# Patient Record
Sex: FEMALE | Race: WHITE | Employment: OTHER | URBAN - METROPOLITAN AREA
[De-identification: names, ages, dates, MRNs, and addresses within clinical notes are randomized per-mention and may not be internally consistent; named-entity substitution may affect disease eponyms.]

---

## 2017-01-17 ENCOUNTER — TELEPHONE (OUTPATIENT)
Dept: PEDIATRICS | Facility: CLINIC | Age: 82
End: 2017-01-17

## 2017-01-17 NOTE — TELEPHONE ENCOUNTER
Received a notice from [facility] stating that they will no longer cover combigan 0.2-0.5% eye drops for pt. Alternative meds that will be covered under pt's plan is: timolol maleate or alphagan p. Please advise.Monique Garcia, CMA

## 2017-01-18 NOTE — TELEPHONE ENCOUNTER
She will need to discuss this with her eye doctor. Please let her son know.       Brandi Agrawal MD

## 2017-01-30 ENCOUNTER — CARE COORDINATION (OUTPATIENT)
Dept: GERIATRIC MEDICINE | Facility: CLINIC | Age: 82
End: 2017-01-30

## 2017-01-30 NOTE — PROGRESS NOTES
TC to client's son Deepa to ask if client has switched to her AL's onsite team. He stated that he has the paperwork, but hasn't switched her yet. Discussed that CM is due to see client in February. Deepa will check his calendar to see when he is able to schedule this.  Gracie Rodriguez RN Case Manager  [facility]  [phone]

## 2017-02-27 ENCOUNTER — CARE COORDINATION (OUTPATIENT)
Dept: GERIATRIC MEDICINE | Facility: CLINIC | Age: 82
End: 2017-02-27

## 2017-02-27 NOTE — PROGRESS NOTES
Home visit/Kashif Risk Assessment/EW screening completed on: February 27, 2017 with client and her son Deepa  Member resides: Assisted living in the Memory unit  Member currently receiving the following services: Al Services, Incontinence supplies  See EMR for a list of client's diagnoses and medications.   Medication management: Medications reviewed:Yes. Medication management: Care giver administers medication. Medication understanding:Caregiver has understanding of regimen and is able to complete med set up/administration Yes. MTM offered.  Member Mood/behavior:  Client denies depression   Plan of Care: Continue with current services.  Follow-Up Plan: Member informed of future contact, plan to f/u with member with a 6 month telephone assessment.  Contact information shared with member and family, encouraged member to call with any questions or concerns prior to this.  See [facility] for further detailed information    Gracie Rodriguez RN Case Manager  [facility]  [phone]

## 2017-03-15 ENCOUNTER — CARE COORDINATION (OUTPATIENT)
Dept: GERIATRIC MEDICINE | Facility: CLINIC | Age: 82
End: 2017-03-15

## 2017-03-15 NOTE — PROGRESS NOTES
Mailed copy of care plan to client.  As requested/needed:  emailed CPS to Dede for auths, updated services in access as needed and submitted appropriate referrals/auths, and entered MMIS. Chart was returned to CM.   Mailed copy of CL tool to client, faxed copy to AL facility, uploaded into MNits and emailed copy to Dede Z for auth.  Ludivina Carrera  CMS Supervisor  [facility]  [phone]

## 2017-03-17 ENCOUNTER — CARE COORDINATION (OUTPATIENT)
Dept: GERIATRIC MEDICINE | Facility: CLINIC | Age: 82
End: 2017-03-17

## 2017-03-17 NOTE — PROGRESS NOTES
Received notice from [facility] that client's MA will stop at the end of March because they didn't help them find out if they have other health insurance. TM left for client's son Deepa informing him of this. Deepa called back to state that he send that information to the [facility].  Gracie Rodriguez RN Case Manager  [facility]  [phone]

## 2017-04-04 ENCOUNTER — TELEPHONE (OUTPATIENT)
Dept: PEDIATRICS | Facility: CLINIC | Age: 82
End: 2017-04-04

## 2017-04-04 NOTE — TELEPHONE ENCOUNTER
[facility] from [facility] calling, Nereida, wondering if you are considered PCP since patient is under memory care and has cognitive impairment. Also would like to know if you are signing off on orders so they know who to get future orders from.    Would like a call back at: [phone] or may call Intake: [phone]    Gerri Matthews RN, BSN, PHN

## 2017-04-04 NOTE — TELEPHONE ENCOUNTER
I spoke with Nereida and Deepa requested that in-house provider see patient.  They don't have a POA for health care on file.  Their provider would be able to assume care but is requesting office appointment notes from 2016.  Patient is in memory care, and unable to sign NATALIYA.  [phone].  Attn Nereida.  Prabha, are we able to fax this information per continuity of care of does this need to go thru [facility]?  MICHELLE Bardales RN

## 2017-04-04 NOTE — TELEPHONE ENCOUNTER
The last time I saw her her son was planning to switch her care to the in house doctor.  They would need to contact him to see if he's done that.  If the family wants to keep seeing me then they would need to bring her in.    Brandi Agrawal MD

## 2017-04-26 ENCOUNTER — RECORDS - HEALTHEAST (OUTPATIENT)
Dept: LAB | Facility: CLINIC | Age: 82
End: 2017-04-26

## 2017-04-27 LAB — HBA1C MFR BLD: 6.4 % (ref 4.2–6.1)

## 2017-07-18 ENCOUNTER — TELEPHONE (OUTPATIENT)
Dept: PEDIATRICS | Facility: CLINIC | Age: 82
End: 2017-07-18

## 2017-07-18 DIAGNOSIS — F41.9 ANXIETY: ICD-10-CM

## 2017-07-18 RX ORDER — LORAZEPAM 0.5 MG/1
0.5 TABLET ORAL EVERY 6 HOURS PRN
Qty: 20 TABLET | Refills: 0 | Status: CANCELLED | OUTPATIENT
Start: 2017-07-18

## 2017-07-18 NOTE — TELEPHONE ENCOUNTER
LORazepam (ATIVAN) 0.5 MG tablet      Last Written Prescription Date:  7/6/2016  Last Fill Quantity: 20,   # refills: 0  Last Office Visit with [facility] or [facility] prescribing provider: 7/19/2016  Future Office visit:       Routing refill request to provider for review/approval because:  Drug not on the [facility] or [facility] refill protocol or controlled substance

## 2017-07-19 NOTE — TELEPHONE ENCOUNTER
Per note 4/4/2017, patient was going to switch to house doc. Please call and find out if this has been done. All refills should be going to this person. If has not switched, then needs to come in for visit. Has not seen Dr. Agrawal in over a year.    Thanks,  Nataly Calderón MD  Internal Medicine/Pediatrics

## 2017-07-21 NOTE — TELEPHONE ENCOUNTER
Patient;s son calls and confirms that all refill were transferred to the house doc-please disregard this message.    Felicia Myers, RN  Message handled by Nurse Triage.

## 2017-08-07 PROBLEM — Z76.89 HEALTH CARE HOME: Status: ACTIVE | Noted: 2017-08-07

## 2017-09-13 ENCOUNTER — APPOINTMENT (OUTPATIENT)
Dept: CT IMAGING | Facility: CLINIC | Age: 82
End: 2017-09-13
Attending: EMERGENCY MEDICINE
Payer: COMMERCIAL

## 2017-09-13 ENCOUNTER — HOSPITAL ENCOUNTER (OUTPATIENT)
Facility: CLINIC | Age: 82
Setting detail: OBSERVATION
Discharge: NURSING, REHAB OR LONG-TERM CARE | End: 2017-09-14
Attending: EMERGENCY MEDICINE | Admitting: INTERNAL MEDICINE
Payer: COMMERCIAL

## 2017-09-13 ENCOUNTER — APPOINTMENT (OUTPATIENT)
Dept: GENERAL RADIOLOGY | Facility: CLINIC | Age: 82
End: 2017-09-13
Attending: EMERGENCY MEDICINE
Payer: COMMERCIAL

## 2017-09-13 DIAGNOSIS — K57.32 DIVERTICULITIS OF COLON: ICD-10-CM

## 2017-09-13 DIAGNOSIS — R10.84 ABDOMINAL PAIN, GENERALIZED: ICD-10-CM

## 2017-09-13 DIAGNOSIS — R79.89 ELEVATED TROPONIN: ICD-10-CM

## 2017-09-13 PROBLEM — K57.92 DIVERTICULITIS: Status: ACTIVE | Noted: 2017-09-13

## 2017-09-13 LAB
ALBUMIN SERPL-MCNC: 3.4 G/DL (ref 3.4–5)
ALBUMIN UR-MCNC: NEGATIVE MG/DL
ALP SERPL-CCNC: 63 U/L (ref 40–150)
ALT SERPL W P-5'-P-CCNC: 13 U/L (ref 0–50)
ANION GAP SERPL CALCULATED.3IONS-SCNC: 9 MMOL/L (ref 3–14)
APPEARANCE UR: CLEAR
AST SERPL W P-5'-P-CCNC: 17 U/L (ref 0–45)
BASOPHILS # BLD AUTO: 0 10E9/L (ref 0–0.2)
BASOPHILS NFR BLD AUTO: 0.4 %
BILIRUB SERPL-MCNC: 0.5 MG/DL (ref 0.2–1.3)
BILIRUB UR QL STRIP: NEGATIVE
BUN SERPL-MCNC: 26 MG/DL (ref 7–30)
CALCIUM SERPL-MCNC: 8.9 MG/DL (ref 8.5–10.1)
CHLORIDE SERPL-SCNC: 102 MMOL/L (ref 94–109)
CO2 SERPL-SCNC: 27 MMOL/L (ref 20–32)
COLOR UR AUTO: ABNORMAL
CREAT SERPL-MCNC: 1.1 MG/DL (ref 0.52–1.04)
DIFFERENTIAL METHOD BLD: ABNORMAL
EOSINOPHIL # BLD AUTO: 0 10E9/L (ref 0–0.7)
EOSINOPHIL NFR BLD AUTO: 0.2 %
ERYTHROCYTE [DISTWIDTH] IN BLOOD BY AUTOMATED COUNT: 13.2 % (ref 10–15)
GFR SERPL CREATININE-BSD FRML MDRD: 47 ML/MIN/1.7M2
GLUCOSE BLDC GLUCOMTR-MCNC: 184 MG/DL (ref 70–99)
GLUCOSE SERPL-MCNC: 148 MG/DL (ref 70–99)
GLUCOSE UR STRIP-MCNC: NEGATIVE MG/DL
HCT VFR BLD AUTO: 35 % (ref 35–47)
HGB BLD-MCNC: 11.8 G/DL (ref 11.7–15.7)
HGB UR QL STRIP: NEGATIVE
IMM GRANULOCYTES # BLD: 0 10E9/L (ref 0–0.4)
IMM GRANULOCYTES NFR BLD: 0.3 %
INTERPRETATION ECG - MUSE: NORMAL
KETONES UR STRIP-MCNC: NEGATIVE MG/DL
LACTATE BLD-SCNC: 0.9 MMOL/L (ref 0.7–2)
LEUKOCYTE ESTERASE UR QL STRIP: ABNORMAL
LIPASE SERPL-CCNC: 232 U/L (ref 73–393)
LYMPHOCYTES # BLD AUTO: 1 10E9/L (ref 0.8–5.3)
LYMPHOCYTES NFR BLD AUTO: 10 %
MCH RBC QN AUTO: 31.6 PG (ref 26.5–33)
MCHC RBC AUTO-ENTMCNC: 33.7 G/DL (ref 31.5–36.5)
MCV RBC AUTO: 94 FL (ref 78–100)
MONOCYTES # BLD AUTO: 1.1 10E9/L (ref 0–1.3)
MONOCYTES NFR BLD AUTO: 10.7 %
MUCOUS THREADS #/AREA URNS LPF: PRESENT /LPF
NEUTROPHILS # BLD AUTO: 8.2 10E9/L (ref 1.6–8.3)
NEUTROPHILS NFR BLD AUTO: 78.4 %
NITRATE UR QL: NEGATIVE
NRBC # BLD AUTO: 0 10*3/UL
NRBC BLD AUTO-RTO: 0 /100
PH UR STRIP: 9 PH (ref 5–7)
PLATELET # BLD AUTO: 139 10E9/L (ref 150–450)
POTASSIUM SERPL-SCNC: 3.9 MMOL/L (ref 3.4–5.3)
PROT SERPL-MCNC: 7 G/DL (ref 6.8–8.8)
RBC # BLD AUTO: 3.73 10E12/L (ref 3.8–5.2)
RBC #/AREA URNS AUTO: 3 /HPF (ref 0–2)
SODIUM SERPL-SCNC: 138 MMOL/L (ref 133–144)
SOURCE: ABNORMAL
SP GR UR STRIP: 1.01 (ref 1–1.03)
SQUAMOUS #/AREA URNS AUTO: <1 /HPF (ref 0–1)
TROPONIN I SERPL-MCNC: 0.11 UG/L (ref 0–0.04)
TROPONIN I SERPL-MCNC: 0.11 UG/L (ref 0–0.04)
TROPONIN I SERPL-MCNC: 0.12 UG/L (ref 0–0.04)
UROBILINOGEN UR STRIP-MCNC: 0 MG/DL (ref 0–2)
WBC # BLD AUTO: 10.4 10E9/L (ref 4–11)
WBC #/AREA URNS AUTO: 7 /HPF (ref 0–2)

## 2017-09-13 PROCEDURE — 25000128 H RX IP 250 OP 636: Performed by: EMERGENCY MEDICINE

## 2017-09-13 PROCEDURE — 74177 CT ABD & PELVIS W/CONTRAST: CPT

## 2017-09-13 PROCEDURE — 36415 COLL VENOUS BLD VENIPUNCTURE: CPT | Performed by: INTERNAL MEDICINE

## 2017-09-13 PROCEDURE — 72125 CT NECK SPINE W/O DYE: CPT

## 2017-09-13 PROCEDURE — 96375 TX/PRO/DX INJ NEW DRUG ADDON: CPT

## 2017-09-13 PROCEDURE — 96367 TX/PROPH/DG ADDL SEQ IV INF: CPT

## 2017-09-13 PROCEDURE — 96361 HYDRATE IV INFUSION ADD-ON: CPT

## 2017-09-13 PROCEDURE — 80053 COMPREHEN METABOLIC PANEL: CPT | Performed by: EMERGENCY MEDICINE

## 2017-09-13 PROCEDURE — 70450 CT HEAD/BRAIN W/O DYE: CPT

## 2017-09-13 PROCEDURE — G0378 HOSPITAL OBSERVATION PER HR: HCPCS

## 2017-09-13 PROCEDURE — 71020 XR CHEST 2 VW: CPT

## 2017-09-13 PROCEDURE — 99285 EMERGENCY DEPT VISIT HI MDM: CPT | Mod: 25

## 2017-09-13 PROCEDURE — 25000128 H RX IP 250 OP 636: Performed by: INTERNAL MEDICINE

## 2017-09-13 PROCEDURE — 25000132 ZZH RX MED GY IP 250 OP 250 PS 637: Performed by: INTERNAL MEDICINE

## 2017-09-13 PROCEDURE — 93005 ELECTROCARDIOGRAM TRACING: CPT

## 2017-09-13 PROCEDURE — 72100 X-RAY EXAM L-S SPINE 2/3 VWS: CPT

## 2017-09-13 PROCEDURE — 40000916 ZZH STATISTIC SITTER, NIGHT HOURS

## 2017-09-13 PROCEDURE — 84484 ASSAY OF TROPONIN QUANT: CPT | Performed by: INTERNAL MEDICINE

## 2017-09-13 PROCEDURE — 25000132 ZZH RX MED GY IP 250 OP 250 PS 637: Performed by: EMERGENCY MEDICINE

## 2017-09-13 PROCEDURE — 84484 ASSAY OF TROPONIN QUANT: CPT | Mod: 91 | Performed by: EMERGENCY MEDICINE

## 2017-09-13 PROCEDURE — 85025 COMPLETE CBC W/AUTO DIFF WBC: CPT | Performed by: EMERGENCY MEDICINE

## 2017-09-13 PROCEDURE — 72072 X-RAY EXAM THORAC SPINE 3VWS: CPT

## 2017-09-13 PROCEDURE — 36415 COLL VENOUS BLD VENIPUNCTURE: CPT | Performed by: EMERGENCY MEDICINE

## 2017-09-13 PROCEDURE — 83690 ASSAY OF LIPASE: CPT | Performed by: EMERGENCY MEDICINE

## 2017-09-13 PROCEDURE — 40000914 ZZH STATISTIC SITTER, DAY HOURS

## 2017-09-13 PROCEDURE — 87086 URINE CULTURE/COLONY COUNT: CPT | Performed by: INTERNAL MEDICINE

## 2017-09-13 PROCEDURE — 96365 THER/PROPH/DIAG IV INF INIT: CPT | Mod: 59

## 2017-09-13 PROCEDURE — 99219 ZZC INITIAL OBSERVATION CARE,LEVL II: CPT | Performed by: INTERNAL MEDICINE

## 2017-09-13 PROCEDURE — 25000125 ZZHC RX 250: Performed by: INTERNAL MEDICINE

## 2017-09-13 PROCEDURE — 25000125 ZZHC RX 250: Performed by: EMERGENCY MEDICINE

## 2017-09-13 PROCEDURE — 83605 ASSAY OF LACTIC ACID: CPT | Performed by: EMERGENCY MEDICINE

## 2017-09-13 PROCEDURE — 96366 THER/PROPH/DIAG IV INF ADDON: CPT | Mod: 59

## 2017-09-13 PROCEDURE — 00000146 ZZHCL STATISTIC GLUCOSE BY METER IP

## 2017-09-13 PROCEDURE — 81001 URINALYSIS AUTO W/SCOPE: CPT | Performed by: EMERGENCY MEDICINE

## 2017-09-13 RX ORDER — ASPIRIN 325 MG
325 TABLET ORAL ONCE
Status: COMPLETED | OUTPATIENT
Start: 2017-09-13 | End: 2017-09-13

## 2017-09-13 RX ORDER — CIPROFLOXACIN 2 MG/ML
400 INJECTION, SOLUTION INTRAVENOUS EVERY 12 HOURS
Status: DISCONTINUED | OUTPATIENT
Start: 2017-09-13 | End: 2017-09-13

## 2017-09-13 RX ORDER — SODIUM CHLORIDE 9 MG/ML
INJECTION, SOLUTION INTRAVENOUS CONTINUOUS
Status: DISCONTINUED | OUTPATIENT
Start: 2017-09-13 | End: 2017-09-14

## 2017-09-13 RX ORDER — NALOXONE HYDROCHLORIDE 0.4 MG/ML
.1-.4 INJECTION, SOLUTION INTRAMUSCULAR; INTRAVENOUS; SUBCUTANEOUS
Status: DISCONTINUED | OUTPATIENT
Start: 2017-09-13 | End: 2017-09-14 | Stop reason: HOSPADM

## 2017-09-13 RX ORDER — ONDANSETRON 2 MG/ML
4 INJECTION INTRAMUSCULAR; INTRAVENOUS EVERY 6 HOURS PRN
Status: DISCONTINUED | OUTPATIENT
Start: 2017-09-13 | End: 2017-09-14 | Stop reason: HOSPADM

## 2017-09-13 RX ORDER — POLYETHYLENE GLYCOL 3350 17 G/17G
17 POWDER, FOR SOLUTION ORAL DAILY PRN
Status: DISCONTINUED | OUTPATIENT
Start: 2017-09-13 | End: 2017-09-14 | Stop reason: HOSPADM

## 2017-09-13 RX ORDER — CIPROFLOXACIN 2 MG/ML
400 INJECTION, SOLUTION INTRAVENOUS EVERY 12 HOURS
Status: DISCONTINUED | OUTPATIENT
Start: 2017-09-14 | End: 2017-09-14 | Stop reason: HOSPADM

## 2017-09-13 RX ORDER — QUETIAPINE FUMARATE 25 MG/1
12.5 TABLET, FILM COATED ORAL DAILY PRN
COMMUNITY

## 2017-09-13 RX ORDER — ONDANSETRON 4 MG/1
4 TABLET, ORALLY DISINTEGRATING ORAL EVERY 6 HOURS PRN
Status: DISCONTINUED | OUTPATIENT
Start: 2017-09-13 | End: 2017-09-14 | Stop reason: HOSPADM

## 2017-09-13 RX ORDER — QUETIAPINE FUMARATE 25 MG/1
12.5 TABLET, FILM COATED ORAL DAILY
COMMUNITY

## 2017-09-13 RX ORDER — QUETIAPINE FUMARATE 25 MG/1
25 TABLET, FILM COATED ORAL EVERY 8 HOURS PRN
Status: DISCONTINUED | OUTPATIENT
Start: 2017-09-13 | End: 2017-09-14 | Stop reason: HOSPADM

## 2017-09-13 RX ORDER — ONDANSETRON 2 MG/ML
4 INJECTION INTRAMUSCULAR; INTRAVENOUS ONCE
Status: COMPLETED | OUTPATIENT
Start: 2017-09-13 | End: 2017-09-13

## 2017-09-13 RX ORDER — IOPAMIDOL 755 MG/ML
500 INJECTION, SOLUTION INTRAVASCULAR ONCE
Status: COMPLETED | OUTPATIENT
Start: 2017-09-13 | End: 2017-09-13

## 2017-09-13 RX ADMIN — IOPAMIDOL 85 ML: 755 INJECTION, SOLUTION INTRAVENOUS at 11:06

## 2017-09-13 RX ADMIN — METRONIDAZOLE 500 MG: 500 INJECTION, SOLUTION INTRAVENOUS at 22:01

## 2017-09-13 RX ADMIN — ONDANSETRON 4 MG: 2 INJECTION INTRAMUSCULAR; INTRAVENOUS at 13:22

## 2017-09-13 RX ADMIN — CIPROFLOXACIN 400 MG: 2 INJECTION, SOLUTION INTRAVENOUS at 15:42

## 2017-09-13 RX ADMIN — SODIUM CHLORIDE 61 ML: 9 INJECTION, SOLUTION INTRAVENOUS at 11:06

## 2017-09-13 RX ADMIN — QUETIAPINE FUMARATE 25 MG: 25 TABLET, FILM COATED ORAL at 21:24

## 2017-09-13 RX ADMIN — METRONIDAZOLE 500 MG: 500 INJECTION, SOLUTION INTRAVENOUS at 14:32

## 2017-09-13 RX ADMIN — ASPIRIN 325 MG ORAL TABLET 325 MG: 325 PILL ORAL at 11:56

## 2017-09-13 RX ADMIN — SODIUM CHLORIDE: 9 INJECTION, SOLUTION INTRAVENOUS at 17:41

## 2017-09-13 NOTE — ED NOTES
Pt arrives via EMS from [facility] d/t fall. Per EMS, staff were doing rounds when they found pt undressed in her laundry basket in closet. Pt ambulatory on scene with EMS but staff states she is a little slower in her gait than normal. No change in neuro status, at pt's baseline with dementia. Pt denies pain.

## 2017-09-13 NOTE — IP AVS SNAPSHOT
Lula Sweet #[id number] (CSN: [id number])  (85 year old F)  (Adm: 17)     [id number]               [facility] OBSERVATION DEPARTMENT: [phone]            Patient Demographics     Patient Name Sex          Age SSN Address Phone    Lula Sweet Female 1932 (85 year old) [id number]  [facility] [address] [phone] (Home)  none (Work)  [phone] (Mobile) *Preferred*      Emergency Contact(s)     Name Relation Home Work Mobile    ANNETTE SWEET Son [phone]    Lori Sweet Daughter [phone] none none      Admission Information     Attending Provider Admitting Provider Admission Type Admission Date/Time    Jerald Thomas MD Jerald allan MD Emergency 17  0825    Discharge Date Hospital Service Auth/Cert Status Service Area     Observation Incomplete [facility]    Unit Room/Bed Admission Status        OBSERVATION DEPT  Admission (Confirmed)       Admission     Complaint    Diverticulitis      Hospital Account     Name Acct ID Class Status Primary Coverage    Lula Sweet [id number] Observation Open UCARE - UCARE FOR SENIORS [facility]/NON [employer] PARTNERS            Guarantor Account (for Hospital Account #[id number])     Name Relation to Pt Service Area Active? Acct Type    Lula Sweet Self FCS Yes Personal/Family    Address Phone          [address]  [place], [place] [address] [phone](H)  none(O)              Coverage Information (for Hospital Account #[id number])     F/O Payor/Plan Precert #    UCARE/UCARE FOR SENIORS [facility]/NON [employer] PARTNERS     Subscriber Subscriber #    Lula Sweet [id number]    Address Phone    [address]  [place] [id number] [phone]                                                INTERAGENCY TRANSFER FORM - PHYSICIAN ORDERS   2017                       [facility] OBSERVATION  "DEPARTMENT: [phone]            Attending Provider: Jerald Thomas MD     Allergies:  Penicillins    Infection:  None   Service:  Observation    Ht:  1.702 m (5' 7.01\")   Wt:  --   Admission Wt:  --    BMI:  --   BSA:  --            ED Clinical Impression     Diagnosis Description Comment Added By Time Added    Diverticulitis of colon [K57.32] Diverticulitis of colon [K57.32]  Nirali Lawrence MD 9/13/2017  3:07 PM    Abdominal pain, generalized [R10.84] Abdominal pain, generalized [R10.84]  Nirali Lawrence MD 9/13/2017  3:08 PM    Elevated troponin [R74.8] Elevated troponin [R74.8]  Nirali Lawrence MD 9/13/2017  3:08 PM      Hospital Problems as of 9/14/2017              Priority Class Noted POA    Diverticulitis Medium  9/13/2017 Yes      Non-Hospital Problems as of 9/14/2017              Priority Class Noted    Hyperlipidemia with target LDL less than 100 High  1/26/2010    Osteoporosis Medium  4/28/2010    Cystocele Low  4/28/2010    Advanced directives, counseling/discussion Low  6/28/2011    Glaucoma High  1/3/2012    Type 2 diabetes mellitus with stage 3 chronic kidney disease (H) High  9/5/2012    Mild dementia Low  2/1/2013    Vitamin D deficiency Medium  10/7/2014    Chondrocalcinosis of knee Medium  7/8/2015    CKD (chronic kidney disease) stage 3, GFR 30-59 ml/min Medium  11/15/2015    Chest pain Medium  12/13/2015    Osteoarthritis of multiple joints Medium  7/1/2016    Health Care Home Medium  8/7/2017      Code Status History     Date Active Date Inactive Code Status Order ID Comments User Context    9/14/2017 10:55 AM  DNR/DNI [id number]  Margarita Valerio DO Outpatient    9/13/2017  4:39 PM 9/14/2017 10:55 AM DNR/DNI [id number]  Jerald Thomas MD Inpatient    12/15/2015  9:10 AM 9/13/2017  4:39 PM Full Code [id number]  Gisele Kapoor MD Outpatient    12/13/2015  3:34 AM 12/15/2015  9:10 AM Full Code [id number]  Judson Brian MD Inpatient    7/1/2014 12:24 " PM 12/13/2015  3:34 AM DNR/DNI [id number]  Lina Bardales RN Outpatient    5/1/2014 11:57 AM 7/1/2014 12:24 PM Full Code [id number]  Lina Bardales RN Outpatient      Current Code Status     Date Active Code Status Order ID Comments User Context       Prior      Summary of Visit     Reason for your hospital stay       You were admitted from memory care after being found in a laundry basket in the closet.  You were not found to have any fractures and no clear infectious process prior to d/c.  You need to be re-evaluated if you have any fevers, abd pain or loss of appetite.                Medication Review      CONTINUE these medications which have NOT CHANGED        Dose / Directions Comments    ACE/ARB NOT PRESCRIBED (INTENTIONAL)        ACE & ARB not prescribed due to Intolerance   Refills:  0        ACETAMINOPHEN PO        Dose:  650 mg   Take 650 mg by mouth every 6 hours as needed for pain   Refills:  0        ADVOCATE SAFETY LANCETS Misc   Used for:  Type 2 diabetes mellitus with stage 3 chronic kidney disease (H)        Use to check blood sugars twice daily and as needed   Quantity:  2 Box   Refills:  3        aspirin 81 MG EC tablet   Used for:  Type 2 diabetes, HbA1c goal < 7% (H)        Dose:  81 mg   Take 1 tablet by mouth daily.   Quantity:  90 tablet   Refills:  3        blood glucose monitoring test strip   Commonly known as:  no brand specified   Used for:  Type 2 diabetes, HbA1c goal < 7% (H)        Use to test blood sugars 2 times daily or as directed   Quantity:  200 each   Refills:  3        latanoprost 0.005 % ophthalmic solution   Commonly known as:  XALATAN        Dose:  1 drop   Place 1 drop into both eyes every evening   Refills:  0        LORazepam 0.5 MG tablet   Commonly known as:  ATIVAN   Used for:  Anxiety        Dose:  0.5 mg   Take 1 tablet (0.5 mg) by mouth every 6 hours as needed for anxiety   Quantity:  20 tablet   Refills:  0        metFORMIN 850 MG tablet   Commonly known as:   GLUCOPHAGE        Dose:  850 mg   Take 1 tablet (850 mg) by mouth 2 times daily (with meals)   Quantity:  60 tablet   Refills:  1        pioglitazone 30 MG tablet   Commonly known as:  ACTOS   Used for:  Type 2 diabetes mellitus with stage 3 chronic kidney disease, without long-term current use of insulin (H)        Dose:  30 mg   Take 1 tablet (30 mg) by mouth daily   Quantity:  90 tablet   Refills:  3        * SEROQUEL 25 MG tablet   Generic drug:  QUEtiapine        Dose:  12.5 mg   Take 12.5 mg by mouth daily   Refills:  0        * SEROQUEL 25 MG tablet   Generic drug:  QUEtiapine        Dose:  12.5 mg   Take 12.5 mg by mouth daily as needed   Refills:  0        STATIN NOT PRESCRIBED (INTENTIONAL)        Statin not prescribed intentionally due to Intolerance (with supporting documentation of trying a statin at least once within the last 5 years)   Quantity:  0 each   Refills:  0        vitamin D 2000 UNITS tablet        Dose:  2000 Units   Take 2,000 Units by mouth daily   Quantity:  100 tablet   Refills:  3        * Notice:  This list has 2 medication(s) that are the same as other medications prescribed for you. Read the directions carefully, and ask your doctor or other care provider to review them with you.            After Care     Activity       Your activity upon discharge: as tolerated       Diet       Follow this diet upon discharge: resume home diabetic diet             Follow-Up Appointment Instructions     Follow-up and recommended labs and tests        F/u with PCP as previously scheduled and prn             Statement of Approval     Ordered          09/14/17 1057  I have reviewed and agree with all the recommendations and orders detailed in this document.  EFFECTIVE NOW     Approved and electronically signed by:  Margarita Valerio DO                                                 INTERAGENCY TRANSFER FORM - NURSING   9/13/2017                       [facility] OBSERVATION  "DEPARTMENT: [phone]            Attending Provider: Jerald Thomas MD     Allergies:  Penicillins    Infection:  None   Service:  Observation    Ht:  1.702 m (5' 7.01\")   Wt:  --   Admission Wt:  --    BMI:  --   BSA:  --            Advance Directives        Does patient have a scanned Advance Directive/ACP document in EPIC?           Yes        Immunizations     Name Date      Influenza (High Dose) 3 valent vaccine 10/01/15     Influenza (High Dose) 3 valent vaccine 10/01/14     Influenza (High Dose) 3 valent vaccine 12/13/13     Influenza (High Dose) 3 valent vaccine 09/05/12     Influenza (High Dose) 3 valent vaccine 01/03/12     Influenza (High Dose) 3 valent vaccine 10/26/10     Influenza (IIV3) 10/13/09     Influenza (IIV3) 10/30/08     Influenza (IIV3) 10/24/07     Influenza (IIV3) 10/20/06     Influenza (IIV3) 01/14/05     Influenza (IIV3) 10/24/03     Influenza (IIV3) 10/17/02     Influenza (IIV3) 10/17/01     Influenza (IIV3) 10/24/00     Pneumococcal (PCV 13) 11/13/15     Pneumococcal 23 valent 06/05/12     Pneumococcal 23 valent 12/09/94     TD (ADULT, 7+) 01/03/12     Tetanus 03/14/75       ASSESSMENT     Discharge Profile Flowsheet     EXPECTED DISCHARGE     Patient's communication style  spoken language (English or Bilingual) 09/13/17 1715    Expected Discharge Date  -- (single/[facility]) 09/14/17 0842   SKIN      GASTROINTESTINAL (ADULT,PEDIATRIC,OB)     Inspection of bony prominences  Full 09/14/17 0821    GI WDL  ex 09/14/17 0821   Full except areas not inspected   -- 09/14/17 0451    All Quadrants Bowel Sounds  audible and active in all quadrants 09/14/17 0821   Skin WDL  WDL 09/14/17 0821    Last Bowel Movement  -- (JANINA) 09/14/17 0821   SAFETY      Passing flatus  -- (JANINA) 09/14/17 0821   Safety WDL  WDL 09/14/17 0821    COMMUNICATION ASSESSMENT                        Assessment WDL (Within Defined Limits) Definitions           Safety WDL     Effective: 09/28/15    " "Row Information: <b>WDL Definition:</b> Bed in low position, wheels locked; call light in reach; upper side rails up x 2; ID band on<br> <font color=\"gray\"><i>Item=AS safety wdl>>List=AS safety wdl>>Version=F14</i></font>      Skin WDL     Effective: 09/28/15    Row Information: <b>WDL Definition:</b> Warm; dry; intact; elastic; without discoloration; pressure points without redness<br> <font color=\"gray\"><i>Item=AS skin wdl>>List=AS skin wdl>>Version=F14</i></font>      Vitals     Vital Signs Flowsheet     VITAL SIGNS     HEIGHT AND WEIGHT      Temp  96.8  F (36  C) 09/14/17 1209   Height  1.702 m (5' 7.01\") 09/14/17 0954    Temp src  Oral 09/14/17 1209   Height Method  Estimated 09/13/17 1618    Resp  16 09/14/17 1209   ADRIENNE COMA SCALE      Heart Rate  64 09/14/17 1209   Best Eye Response  4-->(E4) spontaneous 09/14/17 0442    Pulse/Heart Rate Source  Monitor 09/13/17 1618   Best Motor Response  6-->(M6) obeys commands 09/14/17 0442    BP  146/50 09/14/17 1209   Best Verbal Response  4-->(V4) confused 09/14/17 0442    BP Location  Right arm 09/14/17 0811   [place] Coma Scale Score  14 09/14/17 0442    OXYGEN THERAPY     POSITIONING      SpO2  94 % 09/14/17 1209   Chair  Upright in chair 09/14/17 0821    O2 Device  None (Room air) 09/14/17 1211   DAILY CARE      Oxygen Delivery  2 LPM 09/13/17 1943   Activity Type  ambulated to bathroom;up in chair 09/14/17 0821    PAIN/COMFORT     Activity Level of Assistance  assistance, stand-by 09/14/17 0821    Patient Currently in Pain  denies 09/14/17 0811                 Patient Lines/Drains/Airways Status    Active LINES/DRAINS/AIRWAYS     None            Patient Lines/Drains/Airways Status    Active PICC/CVC     None            Intake/Output Detail Report     None      Last Void/BM       Most Recent Value    Urine Occurrence 1 at 09/14/2017 0745    Stool Occurrence       Case Management/Discharge Planning     Case Management/Discharge Planning Flowsheet     REFERRAL " INFORMATION     Expected Discharge Date  -- (single/[facility]) 09/14/17 0842    Arrived From  assisted living facility 12/13/15 0351   ABUSE RISK SCREEN      LIVING ENVIRONMENT     QUESTION TO PATIENT:  Has a member of your family or a partner(now or in the past) intimidated, hurt, manipulated, or controlled you in any way?  no 09/13/17 0831    Lives With  alone (At Assisted Living) 12/13/15 0351   QUESTION TO PATIENT: Do you feel safe going back to the place where you are living?  yes 09/13/17 0831    COPING/STRESS     OBSERVATION: Is there reason to believe there has been maltreatment of a vulnerable adult (ie. Physical/Sexual/Emotional abuse, self neglect, lack of adequate food, shelter, medical care, or financial exploitation)?  no 09/13/17 0831    Major Change/Loss/Stressor  none 12/13/15 0351   HOMICIDE RISK      EXPECTED DISCHARGE     Homicidal Ideation  no 09/13/17 1715                  [facility] OBSERVATION DEPARTMENT: [phone]            Medication Administration Report for Lula Aguilera as of 09/14/17 1235   Legend:    Given Hold Not Given Due Canceled Entry Other Actions    Time Time (Time) Time  Time-Action       Inactive    Active    Linked        Medications 09/08/17 09/09/17 09/10/17 09/11/17 09/12/17 09/13/17 09/14/17    aspirin EC EC tablet 81 mg  Dose: 81 mg Freq: DAILY Route: PO  Start: 09/14/17 0800   Admin Instructions: DO NOT CRUSH.           0813 (81 mg)-Given           ciprofloxacin (CIPRO) infusion 400 mg  Dose: 400 mg Freq: EVERY 12 HOURS Route: IV  Indications of Use: URINARY TRACT INFECTION  Last Dose: Stopped (09/14/17 0651)  Start: 09/14/17 0400   Admin Instructions: Irritant.           0432 (400 mg)-New Bag       0651-Stopped       [ ] 1600           metroNIDAZOLE (FLAGYL) infusion 500 mg  Dose: 500 mg Freq: EVERY 8 HOURS Route: IV  Indications of Use: INTRA-ABDOMINAL INFECTION  Last Dose: Stopped (09/14/17 0651)  Start: 09/13/17 2200          2201 (500 mg)-New Bag        0539 (500 mg)-New Bag       0651-Stopped       [ ] 1400       [ ] 2200           naloxone (NARCAN) injection 0.1-0.4 mg  Dose: 0.1-0.4 mg Freq: EVERY 2 MIN PRN Route: IV  PRN Reason: opioid reversal  Start: 09/13/17 1639   Admin Instructions: For respiratory rate LESS than or EQUAL to 8.  Partial reversal dose:  0.1 mg titrated q 2 minutes for Analgesia Side Effects Monitoring Sedation Level of 3 (frequently drowsy, arousable, drifts to sleep during conversation).Full reversal dose:  0.4 mg bolus for Analgesia Side Effects Monitoring Sedation Level of 4 (somnolent, minimal or no response to stimulation).               ondansetron (ZOFRAN-ODT) ODT tab 4 mg  Dose: 4 mg Freq: EVERY 6 HOURS PRN Route: PO  PRN Reasons: nausea,vomiting  Start: 09/13/17 1639   Admin Instructions: This is Step 1 of nausea and vomiting management.  If nausea not resolved in 15 minutes, go to Step 2 prochlorperazine (COMPAZINE). Do not push through foil backing. Peel back foil and gently remove. Place on tongue immediately. Administration with liquid unnecessary              Or  ondansetron (ZOFRAN) injection 4 mg  Dose: 4 mg Freq: EVERY 6 HOURS PRN Route: IV  PRN Reasons: nausea,vomiting  Start: 09/13/17 1639   Admin Instructions: This is Step 1 of nausea and vomiting management.  If nausea not resolved in 15 minutes, go to Step 2 prochlorperazine (COMPAZINE).  Irritant.               pioglitazone (ACTOS) tablet 30 mg  Dose: 30 mg Freq: DAILY Route: PO  Start: 09/14/17 0800          0812 (30 mg)-Given           polyethylene glycol (MIRALAX/GLYCOLAX) Packet 17 g  Dose: 17 g Freq: DAILY PRN Route: PO  PRN Reason: constipation  Start: 09/13/17 1639   Admin Instructions: Give in 8oz of  water, juice, or soda. Hold for loose stools.  This is the second step of a three step constipation treatment protocol.  1 Packet = 17 grams. Mixed prescribed dose in 8 ounces of water. Follow with 8 oz. of water.                QUEtiapine (SEROquel) half-tab 12.5 mg  Dose: 12.5 mg Freq: DAILY Route: PO  Start: 09/14/17 0800          0812 (12.5 mg)-Given           QUEtiapine (SEROquel) tablet 25 mg  Dose: 25 mg Freq: EVERY 8 HOURS PRN Route: PO  PRN Comment: agitation  Start: 09/13/17 1840         2124 (25 mg)-Given           Future Medications  Medications 09/08/17 09/09/17 09/10/17 09/11/17 09/12/17 09/13/17 09/14/17       latanoprost (XALATAN) 0.005 % ophthalmic solution 1 drop  Dose: 1 drop Freq: EVERY EVENING Route: Both Eyes  Start: 09/14/17 2000   Admin Instructions: Refrigerated product.           [ ] 2000          Completed Medications  Medications 09/08/17 09/09/17 09/10/17 09/11/17 09/12/17 09/13/17 09/14/17         Dose: 1,000 mL Freq: ONCE Route: IV  Last Dose: Stopped (09/13/17 1117)  Start: 09/13/17 1106   End: 09/13/17 1117         1106 (61 mL)-New Bag [C]       1117-Stopped              Dose: 325 mg Freq: ONCE Route: PO  Start: 09/13/17 1135   End: 09/13/17 1156         1156 (325 mg)-Given              Dose: 500 mL Freq: ONCE Route: IV  Start: 09/13/17 1106   End: 09/13/17 1106         1106 (85 mL)-Given              Dose: 4 mg Freq: ONCE Route: IV  Start: 09/13/17 1320   End: 09/13/17 1324   Admin Instructions: Irritant.          1322 (4 mg)-Given           Discontinued Medications  Medications 09/08/17 09/09/17 09/10/17 09/11/17 09/12/17 09/13/17 09/14/17         Rate: 75 mL/hr Freq: CONTINUOUS Route: IV  Last Dose: Stopped (09/14/17 0432)  Start: 09/13/17 1640   End: 09/14/17 1029         1741 ( )-New Bag        0432-Stopped       1029-Med Discontinued         Dose: 400 mg Freq: EVERY 12 HOURS Route: IV  Indications of Use: URINARY TRACT INFECTION  Last Dose: Stopped (09/13/17 1544)  Start: 09/13/17 1415   End: 09/13/17 1639   Admin Instructions: Irritant.          1542 (400 mg)-New Bag       1544-ED Infusing on Admission/transfer       1639-Med Discontinued          Dose: 500 mg Freq: EVERY 8 HOURS Route:  IV  Indications of Use: URINARY TRACT INFECTION  Last Dose: Stopped (09/13/17 1529)  Start: 09/13/17 1415   End: 09/13/17 1639         1432 (500 mg)-New Bag       1529-Stopped       1639-Med Discontinued                 INTERAGENCY TRANSFER FORM - NOTES (H&P, Discharge Summary, Consults, Procedures, Therapies)   9/13/2017                       [facility] OBSERVATION DEPARTMENT: [phone]            History & Physicals     No notes of this type exist for this encounter.         Discharge Summaries      Discharge Summaries by Margarita Valerio DO at 9/14/2017 10:58 AM     Author:  Margarita Valerio DO Service:  Hospitalist Author Type:  Physician    Filed:  9/14/2017 11:10 AM Date of Service:  9/14/2017 10:58 AM Creation Time:  9/14/2017 10:58 AM    Status:  Signed :  Margarita Valerio DO (Physician)         [facility]    Discharge Summary  Hospitalist    Date of Admission:  9/13/2017  Date of Discharge:  9/14/2017  Provider:  Margarita Valerio DO    Discharge Diagnoses   1.  Episode of increased confusion with h/o profound dementia  2.  Mild troponin elevation    Other medical issues:  Past Medical History:   Diagnosis Date     DM type 2 (diabetes mellitus, type 2) (H) 1990's     Glaucoma      Hyperlipidemia LDL goal < 100      Hypertension goal BP (blood pressure) < 130/80        History of Present Illness   Lula Aguilera is an 85 year old female who presented from memory care after being found incontinent of urine in a laundry basket in her room.  Please see the admission history and physical for full details.    Hospital Course   Lula Aguilera was admitted on 9/13/2017.  The following problems were addressed during her hospitalization:    1.  Acute on chronic confusion  Ms. Aguilera presents from memory care with concern for increased confusion.  She was found in a laundry basket in her closet in her room and was incontinent of urine.  She was  brought into the ED where there was no s/s of acute fractures on imaging of the cervical/thoracic/lulmbar spine and no UTI on UA.  Head CT without any acute abnormalities.    She was at her baseline mentation per son in the room at time of d/c.  He had no concerns about her returning to her [facility] care setting.    2.  Abnormalities on CT of the abd/pelvis  There was question of possible diverticulitis vs. neoplasm on CT scan of the abd/pelvis on admission.  She had no fevers or leukocytosis.  No clear c/o abdominal pain and appetite was good (she ate all of her dinner and breakfast).  Discussed at length with family at bedside.  I think that it is reasonable to not give her abx because there is not a clear clinical infection.  They do not want any invasive procedures, as well.    3.  Troponin elevation, mild  Trending down on serial lab checks.  No clear CP and she appears comfortable.  Family not interested in any further testing.    4.  DM  No changes in her home diet (diabetic) or meds at time of discharge.    Significant Results and Procedures     Pending Results   Unresulted Labs Ordered in the Past 30 Days of this Admission     Date and Time Order Name Status Description    9/14/2017 0007 Urine Culture Aerobic Bacterial Preliminary           Code Status   DNR / DNI       Primary Care Physician   Brandi Agrawal    Physical Exam   Temp: 96.8  F (36  C) Temp src: Oral BP: 145/51   Heart Rate: 62 Resp: 16 SpO2: 91 % O2 Device: None (Room air) Oxygen Delivery: 2 LPM  There were no vitals filed for this visit.  Vital Signs with Ranges  Temp:  [96.1  F (35.6  C)-97.8  F (36.6  C)] 96.8  F (36  C)  Heart Rate:  [62-79] 62  Resp:  [16-20] 16  BP: (102-175)/() 145/51  SpO2:  [91 %-96 %] 91 %     PT SEEN AND EXAMINED ON DAY OF D/C  GEN:  Alert, oriented to self only, comfortable at rest  HEENT:  Normocephalic/atraumatic, PERRL, no scleral icterus, no nasal discharge, mouth moist  CV:  Regular rate and  rhythm, no loud murmur to ausc.  S1 + S2 noted, no S3 or S4.  LUNGS:  Clear to auscultation ant/lat bilaterally.  No rales/rhonchi/wheezing auscultated bilaterally.  No costal retractions bilaterally.  Symmetric chest rise on inhalation noted.  ABD:  Active bowel sounds, soft, non-tender, mildly distended.  No clear rebound/guarding/rigidity.  No masses palpated.  No obvious HSM to exam.  EXT:  No edema or cyanosis bilaterally. No joint synovitis noted.  No calf-tenderness or asymmetry noted.  SKIN:  Dry to touch, no rashes or jaundice noted.  PSYCH:  Mood appropriate, Not tearful or depressed.  Not agitated  NEURO:  No tremors at rest, memory poor.  Alert and repetitive    Discharge Disposition   Discharged to memory care    Consultations This Hospital Stay   SOCIAL WORK IP CONSULT    Time Spent on this Encounter   IMargarita, personally saw the patient today and spent greater than 30 minutes discharging this patient.    Discharge Orders     Reason for your hospital stay   You were admitted from memory care after being found in a laundry basket in the closet.  You were not found to have any fractures and no clear infectious process prior to d/c.  You need to be re-evaluated if you have any fevers, abd pain or loss of appetite.     Follow-up and recommended labs and tests    F/u with PCP as previously scheduled and prn     Activity   Your activity upon discharge: as tolerated     DNR/DNI     Diet   Follow this diet upon discharge: resume home diabetic diet       Discharge Medications   Current Discharge Medication List      CONTINUE these medications which have NOT CHANGED    Details   !! QUEtiapine (SEROQUEL) 25 MG tablet Take 12.5 mg by mouth daily      pioglitazone (ACTOS) 30 MG tablet Take 1 tablet (30 mg) by mouth daily  Qty: 90 tablet, Refills: 3    Associated Diagnoses: Type 2 diabetes mellitus with stage 3 chronic kidney disease, without long-term current use of insulin (H)      metFORMIN  (GLUCOPHAGE) 850 MG tablet Take 1 tablet (850 mg) by mouth 2 times daily (with meals)  Qty: 60 tablet, Refills: 1      Cholecalciferol (VITAMIN D) 2000 UNITS tablet Take 2,000 Units by mouth daily  Qty: 100 tablet, Refills: 3      latanoprost (XALATAN) 0.005 % ophthalmic solution Place 1 drop into both eyes every evening       aspirin 81 MG EC tablet Take 1 tablet by mouth daily.  Qty: 90 tablet, Refills: 3    Associated Diagnoses: Type 2 diabetes, HbA1c goal < 7% (H)      !! QUEtiapine (SEROQUEL) 25 MG tablet Take 12.5 mg by mouth daily as needed      LORazepam (ATIVAN) 0.5 MG tablet Take 1 tablet (0.5 mg) by mouth every 6 hours as needed for anxiety  Qty: 20 tablet, Refills: 0    Associated Diagnoses: Anxiety      STATIN NOT PRESCRIBED, INTENTIONAL, Statin not prescribed intentionally due to Intolerance (with supporting documentation of trying a statin at least once within the last 5 years)  Qty: 0 each, Refills: 0      ACE/ARB NOT PRESCRIBED, INTENTIONAL, ACE & ARB not prescribed due to Intolerance      ACETAMINOPHEN PO Take 650 mg by mouth every 6 hours as needed for pain      ADVOCATE SAFETY LANCETS MISC Use to check blood sugars twice daily and as needed  Qty: 2 Box, Refills: 3    Associated Diagnoses: Type 2 diabetes mellitus with stage 3 chronic kidney disease (H)      blood glucose monitoring (NO BRAND SPECIFIED) test strip Use to test blood sugars 2 times daily or as directed  Qty: 200 each, Refills: 3    Associated Diagnoses: Type 2 diabetes, HbA1c goal < 7% (H)       !! - Potential duplicate medications found. Please discuss with provider.        Allergies   Allergies   Allergen Reactions     Penicillins      Data[CB1.1]     Recent Labs  Lab 09/13/17  0955   WBC 10.4   HGB 11.8   HCT 35.0   MCV 94   *       Recent Labs  Lab 09/13/17  0955      POTASSIUM 3.9   CHLORIDE 102   CO2 27   ANIONGAP 9   *   BUN 26   CR 1.10*   GFRESTIMATED 47*   GFRESTBLACK 57*   WADE 8.9       Recent  Labs  Lab 09/13/17  0955      POTASSIUM 3.9   CHLORIDE 102   CO2 27   ANIONGAP 9   *   BUN 26   CR 1.10*   GFRESTIMATED 47*   GFRESTBLACK 57*   WADE 8.9   PROTTOTAL 7.0   ALBUMIN 3.4   BILITOTAL 0.5   ALKPHOS 63   AST 17   ALT 13       Recent Labs  Lab 09/13/17  1835 09/13/17  1318 09/13/17  0955   TROPI 0.110* 0.116* 0.106*       Recent Labs  Lab 09/13/17  0930   COLOR Straw   APPEARANCE Clear   URINEGLC Negative   URINEBILI Negative   URINEKETONE Negative   SG 1.010   UBLD Negative   URINEPH 9.0*   PROTEIN Negative   NITRITE Negative   LEUKEST Trace*   RBCU 3*   WBCU 7*     Results for orders placed or performed during the hospital encounter of 09/13/17   XR Chest 2 Views    Narrative    XR CHEST 2 VW  9/13/2017 11:27 AM    HISTORY:  hypoxia    COMPARISON:  1/24/2016      Impression    IMPRESSION:  Shallow inspiration. Probable scarring at the  costophrenic angles. Heart size likely upper limits of normal for  portable technique. No acute process.     SHAI BAILEY MD   CT Head w/o Contrast    Narrative    CT OF THE HEAD WITHOUT CONTRAST September 13, 2017 11:25 AM     HISTORY: Fall, dementia.    TECHNIQUE: 5 mm thick axial CT images of the head were acquired  without IV contrast material. Radiation dose for this scan was reduced  using automated exposure control, adjustment of the mA and/or kV  according to patient size, or iterative reconstruction technique.    COMPARISON: Brain MR 4/29/2013.    FINDINGS: The study is mildly limited by patient motion. There is  moderate diffuse cerebral volume loss. There are subtle patchy areas  of decreased density in the cerebral white matter bilaterally that are  consistent with sequela of chronic small vessel ischemic disease.     The ventricles and basal cisterns are within normal limits in  configuration given the degree of cerebral volume loss. There is no  midline shift. There are no extra-axial fluid collections.     No intracranial hemorrhage, mass or  recent infarct.    The visualized paranasal sinuses are well-aerated. There is no  mastoiditis. There are no fractures of the visualized bones.       Impression    IMPRESSION: Limited study due to patient motion. Diffuse cerebral  volume loss and cerebral white matter changes consistent with chronic  small vessel ischemic disease. No evidence for acute intracranial  pathology.          LINDA SMITH MD   CT Abdomen Pelvis w Contrast    Narrative    CT ABDOMEN AND PELVIS WITH CONTRAST   9/13/2017 11:25 AM     HISTORY: Dementia, diabetes, patient fell and was found sitting in a  laundry basket and incontinent. Tenderness to palpation of lower  thoracic and upper lumbar spine at the midline.    TECHNIQUE: 85 mL Isovue-370. Radiation dose for this scan was reduced  using automated exposure control, adjustment of the mA and/or kV  according to patient size, or iterative reconstruction technique.    COMPARISON: None.    FINDINGS:   Abdomen: Thoracolumbar degenerative changes with no definite fracture.  Patchy infiltrates at the lung bases. Mild cardiomegaly with no  specific chamber enlargement. Normal liver, spleen, pancreas,  gallbladder and adrenal glands. Small bilateral renal cysts. Patchy  renal cortical thinning. There is a 0.5 cm stone in a posterior left  lower renal calyx. No adenopathy. Stomach and small bowel are  unremarkable.    Pelvis: Colonic diverticulosis. Strand-like densities adjacent to the  descending colon and slight colonic wall thickening and pelvic side  wall thickening suggests diverticulitis, but since the patient is  afebrile, the possibility of neoplasm should be considered. Atrophic  uterus and ovaries. Normal bladder.      Impression    IMPRESSION:  1. No evidence of acute trauma.  2. Thickening of the walls of the sigmoid colon in an area of  diverticulosis with adjacent strand-like density. Differential  diagnosis includes diverticulitis and neoplasm.  3. Patchy nonspecific infiltrates  at the lung bases.  4. Renal cysts.  5. Left lower pole renal calculus.    LONG MURILLO MD   Cervical spine CT w/o contrast    Narrative    CT OF THE CERVICAL SPINE WITHOUT CONTRAST September 13, 2017 11:25 AM     HISTORY: Pain, fall.     TECHNIQUE: Axial images of the cervical spine were acquired without  intravenous contrast. Multiplanar reformations were created. Radiation  dose for this scan was reduced using automated exposure control,  adjustment of the mA and/or kV according to patient size, or iterative  reconstruction technique.    COMPARISON: None.    FINDINGS: The study is mildly limited by patient motion.    There is normal alignment of the cervical vertebrae. Vertebral body  heights of the cervical spine are normal. Craniocervical alignment is  normal. There are no fractures of the cervical spine.        Impression    IMPRESSION: Mildly limited study due to patient motion. No evidence  for fracture or traumatic malalignment.      LINDA SMITH MD   Thoracic spine XR, 3 views    Narrative    XR THORACIC SPINE 3 VW  9/13/2017 2:05 PM    HISTORY:  pain, fall    COMPARISON:  None.      Impression    IMPRESSION:  Significant motion on the lateral view. Multilevel  degenerative changes. Mild vertebral body height loss at several  levels but no focal significant compression fractures.     SHAI BAILEY MD   Lumbar spine XR, 2-3 views    Narrative    XR LUMBAR SPINE 2-3 VIEWS  9/13/2017 2:05 PM    HISTORY:  pain, fall    COMPARISON:  None.      Impression    IMPRESSION:  Osteopenia. No acute process identified. Very mild  multilevel degenerative changes. Degenerative facet changes at L5.  Contrast in the renal collecting system from recent CT scan.    SHAI BAILEY MD[CB1.2]          Revision History        User Key Date/Time User Provider Type Action    > CB1.2 9/14/2017 11:10 AM Margarita Valerio DO Physician Sign     CB1.1 9/14/2017 10:58 AM Margarita Valerio DO Physician                    Consult Notes     No notes of this type exist for this encounter.         Progress Notes - Physician (Notes for yesterday and today)      ED Provider Notes by Nirali Lawrence MD at 9/13/2017  8:25 AM     Author:  Nirali Lawrence MD Service:  Emergency Medicine Author Type:  Physician    Filed:  9/13/2017  3:12 PM Date of Service:  9/13/2017  8:25 AM Creation Time:  9/13/2017  8:34 AM    Status:  Signed :  Nirali Lawrence MD (Physician)           History     Chief Complaint:  Fall    History limited by: Dementia.      Lula Aguilera is a 85 year old female with a history of dementia, diabetes, who presents to the emergency department today for evaluation of a fall. The patient lives in a nursing home where she is checked every two hours. Today, the patient was found sitting in a laundry basket, partially undressed an incontinent of urine. Here, she reports pain[MN1.1] abdominal pain[JB1.1] but is otherwise nonspecific in describing pain.    Allergies:  Penicillins    Medications:    Actos  Ativan  Metformin  Relafen  Latanoprost ophthalmic solution  Combigan opthalmic solution  Fosamax  Aspirin     Past Medical History:    Type 2 diabetes  Glaucoma  Hyperlipidemia  Hypertension  Dementia    Past Surgical History:    Appendectomy     Family History:    Alzheimer's disease - mother    Social History:  The patient was accompanied to the ED by EMS.  Smoking Status: Never smoker  Smokeless Tobacco: Never used  Alcohol Use: No  Marital Status:  Single [1]     Review of Systems   Unable to perform ROS: Dementia     Physical Exam   Vitals:[MN1.1]  Patient Vitals for the past 24 hrs:   BP Temp Temp src Heart Rate Resp SpO2   09/13/17 1404 - - - - - 95 %   09/13/17 1403 - - - - - 94 %   09/13/17 1402 (!) 128/103 - - - - -   09/13/17 1310 - - - - - 94 %   09/13/17 1309 (!) 143/97 - - - - -   09/13/17 1200 102/77 - - 76 - 94 %   09/13/17 1131 - - - 79 - 96 %   09/13/17 1129 142/71 - - - - -   09/13/17 1040  - - - 82 - 96 %   09/13/17 1025 - - - 83 - 95 %   09/13/17 1015 - - - 83 - 96 %   09/13/17 1000 153/59 - - 89 - 94 %   09/13/17 0945 - - - 92 - 95 %   09/13/17 0915 - - - 97 - 94 %   09/13/17 0900 142/65 - - 106 - 94 %   09/13/17 0845 - - - 98 - 95 %   09/13/17 0832 (!) 139/94 98.4  F (36.9  C) Temporal 104 20 90 %   09/13/17 0830 - - - - - 92 %[MN1.2]     Physical Exam  Physical Exam   General: Resting on the bed.  Head: No obvious trauma to head.  Ears, Nose, Throat:  External ears normal.  Nose normal.  No pharygeal erythema, swelling or exudate.  Midline uvula.  Both ears clear, no hemotympanum.  Eyes:  Conjunctivae and EOM are normal.  Right pupil is equal, round, and reactive. Left eye with glaucoma changes, non reactive.   Neck: Normal range of motion.  Neck supple.   CV: Regular rate and rhythm.  No murmurs.      Respiratory: Effort normal and breath sounds normal.  No wheezing or crackles.   Gastrointestinal: Soft.  No distension.[MN1.1] General tenderness to[JB1.1] palpation. There is no rigidity, no rebound and no guarding.   Musculoskeletal: Normal range of motion. Able to range bilateral upper and lower extremities without pain. Tenderness to palpation of low T spine/upper L spine tenderness at midline, overlying  Neuro: Alert, oriented to self. Moving all extremities appropriately.  Normal speech.    Skin: Skin is warm and dry.  No rash noted.     Emergency Department Course     ECG:[MN1.1]  ECG taken at 0913, ECG read at 0921  Normal sinus rhythm  Left anterior fascicular block  Anterolateral infarct, age undetermined  aVL appears largely unchanged  Abnormal ECG  Rate 100 bpm. NJ interval 194. QRS duration 100. QT/QTc 366/472. P-R-T axes 53 -57 72.[MN1.3]    Imaging:  Radiology findings were communicated with the[MN1.1] patient and family[MN1.4] who voiced understanding of the findings.[MN1.1]    XR chest 2 views  Shallow inspiration. Probable scarring at the  costophrenic angles. Heart size likely  upper limits of normal for  portable technique. No acute process.   Reading per radiology    Lumbar spine XR, 2-3 views  Osteopenia. No acute process identified. Very mild  multilevel degenerative changes. Degenerative facet changes at L5.  Contrast in the renal collecting system from recent CT scan.  Reading per radiology    Thoracic spine XR, 3 views  Significant motion on the lateral view. Multilevel  degenerative changes. Mild vertebral body height loss at several  levels but no focal significant compression fractures.   Reading per radiology    Cervical spine CT w/o contrast  Mildly limited study due to patient motion. No evidence  for fracture or traumatic malalignment.  Reading per radiology    CT Abdomen Pelvis w contrast  1. No evidence of acute trauma.  2. Thickening of the walls of the sigmoid colon in an area of  diverticulosis with adjacent strand-like density. Differential  diagnosis includes diverticulitis and neoplasm.  3. Patchy nonspecific infiltrates at the lung bases.  4. Renal cysts.  5. Left lower pole renal calculus.  Reading per radiology    CT Head w/o contrast   Limited study due to patient motion. Diffuse cerebral  volume loss and cerebral white matter changes consistent with chronic  small vessel ischemic disease. No evidence for acute intracranial  pathology.    Reading per radiology[MN1.4]    Laboratory:  Laboratory findings were communicated with the[MN1.1] patient and family[MN1.4] who voiced understanding of the findings.[MN1.1]    CBC:  (L) o/w WNL. (WBC 10.4, HGB 11.8)   Troponin (Collected 0955): 0.106 (H)  Lactic Acid (Collected at 0955): 0.9  CMP: Glucose 148 (H), Creatinine 1.1 (H), GFR 47 (L) o/w WNL  Lipase: 232    UA with micro: pH 9.0 (H), leukocyte esterase urine trace (A), RBC/HPF 3 (H), WBC/HPF 7 (H), Mucous present (A) o/w negative[MN1.4]    Interventions:[MN1.1]  1156 aspirin 325 mg PO  1322 Zofran 4 mg IV[MN1.4]  1432[MN1.5] Flagyl 500 mg IV[MN1.4]  1510[MN1.5]  Cipro 400 mg IV[MN1.4]     Emergency Department Course:  Nursing notes and vitals reviewed.  I performed an exam of the patient as documented above.[MN1.1]   IV was inserted and blood was drawn for laboratory testing, results above.  The patient provided a urine sample here in the emergency department. This was sent for laboratory testing, findings above.  The patient was sent for an XR chest 2 views, Lumbar spine XR, 2-3 views, Thoracic spine XR, 3 views, Cervical spine CT w/o contrast, CT Abdomen Pelvis w contrast, and CT Head w/o contrast while in the emergency department, results above.   At 1223 the patient was rechecked and family was updated on the results of laboratory and imaging studies.   At 1410 the patient was rechecked and was updated on the results of laboratory and imaging studies.   I discussed the treatment plan with the patient. They expressed understanding of this plan and consented to admission. I discussed the patient with[MN1.4] Dr. Thomas[MN1.5], who will admit the patient to a monitored bed for further evaluation and treatment.[MN1.4]  I personally reviewed the laboratory[MN1.1] and imaging[MN1.4] results with the[MN1.1] patient and family[MN1.4] and answered all related questions prior to[MN1.1] admission[MN1.4].    Impression & Plan      Medical Decision Making:[MN1.1]  Lula Aguilera is a 85 year old female with a history of dementia presenting with unclear fall. Patient was found in a basket as noted above. Vitals reassuring, mild tachycardia resolved. Broad differential pursued, including but not limited to, mechanical fall, subdural intracranial hemorrhage, contusion, TBI, acute coronary syndrome, pulmonary embolism, pneumonia, or infection, acute abdominal process, urinary tract infection, etc. Very difficult history obtained as patient has underlying dementia and is unable to give a much of a history. Initial EKG was reviewed showing[MN1.4] no[MN1.5] signs[MN1.4] of[MN1.5] acute  ST[MN1.4]-t wave[JB1.1] changes, aVL appears unchanged. She does have a small troponin bump at 0.106. In[MN1.4] re[JB1.1]viewing prior history, it appears that she has had a troponin elevation in the past as well[MN1.4] but not this high[JB1.1]. Difficult to ascertain if patient is having active chest pain, she denies any. BMP shows CKD, creatinine at baseline. LFTs unremarkable. Lipase unremarkable. Lactate reassuring for any acute intraabdominal process. Urinalysis does appear concerning for mild urinary tract infection. Imaging of her head and cervical spine was obtained.     Imaging returned with negative head and neck images. CT of the abdomen does show thickening of the sigmoid colon, possible diverticulitis versus neoplasm. Nonspecific infiltrates in the lung bases. These are not visible on the chest xray. She is saturating well without any cough to suggest pneumonia at this time. Xray of the T and L spines[MN1.4] negative[JB1.1]. Urinalysis does show evidence of mild urinary tract infection. Plan to treat for diverticulitis and urinary tract infection[MN1.4].[JB1.1]    Had lengthy discussion with son about elevated troponin. Son feels that patient will benefit more from going home than staying in the hospital. Plan to check a repeat troponin to see if down trending. If down trending, this seems appropriate for discharge home. There are no ST changes or ischemic changes on EKG to suggest ACS at this time.  Discussed risks and benefits of this plan including that the patient may die or have a heart attack at the nursing home. He voices understanding of these risks and feels the benefits of returning home exceeds the risk.  Troponin recheck returned elevated at 0.116. I discussed these results with the patient's son and he agreed to admission for management and care.[MN1.4]  Unclear etiology of troponin elevation thus feel admission and serial troponins would be of benefit.   hospitalist kindly accepted  admission.  Stable under my care.[JB1.1]      Diagnosis:[MN1.1]    ICD-10-CM    1. Diverticulitis of colon K57.32    2. Abdominal pain, generalized R10.84    3. Elevated troponin R74.8[JB1.2]        Disposition:[MN1.1]   Admission[MN1.5]    Scribe Disclosure:  I, Marck Brandon, am serving as a scribe at 8:43 AM on 9/13/2017 to document services personally performed by Nirali Lawrence MD, based on my observations and the provider's statements to me.    9/13/2017   [facility] EMERGENCY DEPARTMENT[MN1.1]       Nirali Lawrence MD  09/13/17 1512  [JB1.3]     Revision History        User Key Date/Time User Provider Type Action    > JB1.3 9/13/2017  3:12 PM Nirali Lawrence MD Physician Sign     JB1.2 9/13/2017  3:11 PM Nirali Lawrence MD Physician      JB1.1 9/13/2017  3:08 PM Nirali Lawrence MD Physician      MN1.2 9/13/2017  3:00 PM Aleksandr, Edisaz Scribe Share     MN1.5 9/13/2017  2:54 PM Jonathanawaty, Motaz Scribe      MN1.4 9/13/2017  2:27 PM Jonathanawaty, Motaz Scribe Share     MN1.3 9/13/2017 12:42 PM Jonathanawaty, Motaz Scribe Share     MN1.1 9/13/2017  9:04 AM Jonathanjonaslesly, Motaz Scribe Share                  Procedure Notes     No notes of this type exist for this encounter.      Progress Notes - Therapies (Notes from 09/11/17 through 09/14/17)     No notes of this type exist for this encounter.                                          INTERAGENCY TRANSFER FORM - LAB / IMAGING / EKG / EMG RESULTS   9/13/2017                       [facility] OBSERVATION DEPARTMENT: [phone]            Unresulted Labs     None         Lab Results - 3 Days      Comprehensive metabolic panel [[id number]] (Abnormal)  Resulted: 09/14/17 1143, Result status: Final result    Ordering provider: Margarita Valerio DO  09/14/17 1029 Resulting lab: [facility]    Specimen Information    Type Source Collected On   Blood  09/14/17 1110          Components       Value Reference  Range Flag Lab   Sodium 137 133 - 144 mmol/L  FrRdHs   Potassium 3.7 3.4 - 5.3 mmol/L  FrRdHs   Chloride 102 94 - 109 mmol/L  FrRdHs   Carbon Dioxide 27 20 - 32 mmol/L  FrRdHs   Anion Gap 8 3 - 14 mmol/L  FrRdHs   Glucose 161 70 - 99 mg/dL H FrRdHs   Urea Nitrogen 21 7 - 30 mg/dL  FrRdHs   Creatinine 1.20 0.52 - 1.04 mg/dL H FrRdHs   GFR Estimate 43 >60 mL/min/1.7m2 L FrRdHs   Comment:  Non  GFR Calc   GFR Estimate If Black 52 >60 mL/min/1.7m2 L FrRdHs   Comment:  African American GFR Calc   Calcium 8.9 8.5 - 10.1 mg/dL  FrRdHs   Bilirubin Total 0.5 0.2 - 1.3 mg/dL  FrRdHs   Albumin 3.4 3.4 - 5.0 g/dL  FrRdHs   Protein Total 7.2 6.8 - 8.8 g/dL  FrRdHs   Alkaline Phosphatase 67 40 - 150 U/L  FrRdHs   ALT 17 0 - 50 U/L  FrRdHs   AST 17 0 - 45 U/L  FrRdHs            WBC and differential [[id number]]  Resulted: 09/14/17 1125, Result status: Final result    Ordering provider: Margarita Valerio DO  09/14/17 1029 Resulting lab: [facility]    Specimen Information    Type Source Collected On   Blood  09/14/17 1110          Components       Value Reference Range Flag Lab   WBC 8.7 4.0 - 11.0 10e9/L  FrRdHs   Diff Method Automated Method   FrRdHs   % Neutrophils 66.4 %  FrRdHs   % Lymphocytes 19.7 %  FrRdHs   % Monocytes 9.4 %  FrRdHs   % Eosinophils 3.6 %  FrRdHs   % Basophils 0.6 %  FrRdHs   % Immature Granulocytes 0.3 %  FrRdHs   Nucleated RBCs 0 0 /100  FrRdHs   Absolute Neutrophil 5.8 1.6 - 8.3 10e9/L  FrRdHs   Absolute Lymphocytes 1.7 0.8 - 5.3 10e9/L  FrRdHs   Absolute Monocytes 0.8 0.0 - 1.3 10e9/L  FrRdHs   Absolute Eosinophils 0.3 0.0 - 0.7 10e9/L  FrRdHs   Absolute Basophils 0.1 0.0 - 0.2 10e9/L  FrRdHs   Abs Immature Granulocytes 0.0 0 - 0.4 10e9/L  FrRdHs   Absolute Nucleated RBC 0.0   FrRdHs            Glucose by meter [[id number]] (Abnormal)  Resulted: 09/14/17 0831, Result status: Final result    Ordering provider: Jerald Thomas MD  09/14/17 0808 Resulting lab: [facility] CARE TEST, GLUCOSE    Specimen Information    Type Source Collected On     09/14/17 0808          Components       Value Reference Range Flag Lab   Glucose 120 70 - 99 mg/dL H 170            Urine Culture Aerobic Bacterial [[id number]]  Resulted: 09/14/17 0222, Result status: Preliminary result    Ordering provider: Jerald Thomas MD  09/14/17 0007 Resulting lab: [facility]    Specimen Information    Type Source Collected On   Midstream Urine  09/13/17 0930          Components       Value Reference Range Flag Lab   Specimen Description Midstream Urine      Special Requests Specimen received in preservative   75   Culture Micro PENDING               Troponin I [[id number]] (Abnormal)  Resulted: 09/13/17 1906, Result status: Final result    Ordering provider: Jerald Thomas MD  09/13/17 1639 Resulting lab: [facility]    Specimen Information    Type Source Collected On   Blood  09/13/17 1835          Components       Value Reference Range Flag Lab   Troponin I ES 0.110 0.000 - 0.045 ug/L H FrRdHs   Comment:         The 99th percentile for upper reference range is 0.045 ug/L.  Troponin values   in the range of 0.045 - 0.120 ug/L may be associated with risks of adverse   clinical events.              Glucose by meter [[id number]] (Abnormal)  Resulted: 09/13/17 1725, Result status: Final result    Ordering provider: Jerald Thomas MD  09/13/17 1713 Resulting lab: POINT OF CARE TEST, GLUCOSE    Specimen Information    Type Source Collected On     09/13/17 1713          Components       Value Reference Range Flag Lab   Glucose 184 70 - 99 mg/dL H 170   Comment:  /RN Notified            Troponin I (now) [[id number]] (Abnormal)  Resulted: 09/13/17 1348, Result status: Final result    Ordering provider: Nirali Lawrence MD  09/13/17 1300 Resulting lab: [facility]    Specimen Information    Type Source Collected On   Blood  09/13/17 1318           Components       Value Reference Range Flag Lab   Troponin I ES 0.116 0.000 - 0.045 ug/L H FrRdHs   Comment:         The 99th percentile for upper reference range is 0.045 ug/L.  Troponin values   in the range of 0.045 - 0.120 ug/L may be associated with risks of adverse   clinical events.              Troponin I [[id number]] (Abnormal)  Resulted: 09/13/17 1026, Result status: Final result    Ordering provider: Nirali Lawrence MD  09/13/17 0846 Resulting lab: [facility]    Specimen Information    Type Source Collected On   Blood  09/13/17 0955          Components       Value Reference Range Flag Lab   Troponin I ES 0.106 0.000 - 0.045 ug/L H FrRd   Comment:         The 99th percentile for upper reference range is 0.045 ug/L.  Troponin values   in the range of 0.045 - 0.120 ug/L may be associated with risks of adverse   clinical events.              Comprehensive metabolic panel [[id number]] (Abnormal)  Resulted: 09/13/17 1026, Result status: Final result    Ordering provider: Nirali Lawrence MD  09/13/17 0846 Resulting lab: [facility]    Specimen Information    Type Source Collected On   Blood  09/13/17 0955          Components       Value Reference Range Flag Lab   Sodium 138 133 - 144 mmol/L  FrRdHs   Potassium 3.9 3.4 - 5.3 mmol/L  FrRdHs   Chloride 102 94 - 109 mmol/L  FrRdHs   Carbon Dioxide 27 20 - 32 mmol/L  FrRdHs   Anion Gap 9 3 - 14 mmol/L  FrRdHs   Glucose 148 70 - 99 mg/dL H FrRdHs   Urea Nitrogen 26 7 - 30 mg/dL  FrRdHs   Creatinine 1.10 0.52 - 1.04 mg/dL H FrRdHs   GFR Estimate 47 >60 mL/min/1.7m2 L FrRdHs   Comment:  Non  GFR Calc   GFR Estimate If Black 57 >60 mL/min/1.7m2 L FrRdHs   Comment:  African American GFR Calc   Calcium 8.9 8.5 - 10.1 mg/dL  FrRdHs   Bilirubin Total 0.5 0.2 - 1.3 mg/dL  FrRdHs   Albumin 3.4 3.4 - 5.0 g/dL  FrRdHs   Protein Total 7.0 6.8 - 8.8 g/dL  FrRdHs   Alkaline Phosphatase 63 40 - 150 U/L  FrRdHs   ALT 13 0 - 50  U/L  FrRdHs   AST 17 0 - 45 U/L  FrRdHs            Lipase [[id number]]  Resulted: 09/13/17 1026, Result status: Final result    Ordering provider: Nirali Lawrence MD  09/13/17 0846 Resulting lab: [facility]    Specimen Information    Type Source Collected On   Blood  09/13/17 0955          Components       Value Reference Range Flag Lab   Lipase 232 73 - 393 U/L  FrRdHs            CBC with platelets differential [[id number]] (Abnormal)  Resulted: 09/13/17 1008, Result status: Final result    Ordering provider: Nirali Lawrence MD  09/13/17 0846 Resulting lab: [facility]    Specimen Information    Type Source Collected On   Blood  09/13/17 0955          Components       Value Reference Range Flag Lab   WBC 10.4 4.0 - 11.0 10e9/L  FrRdHs   RBC Count 3.73 3.8 - 5.2 10e12/L L FrRdHs   Hemoglobin 11.8 11.7 - 15.7 g/dL  FrRdHs   Hematocrit 35.0 35.0 - 47.0 %  FrRdHs   MCV 94 78 - 100 fl  FrRdHs   MCH 31.6 26.5 - 33.0 pg  FrRdHs   MCHC 33.7 31.5 - 36.5 g/dL  FrRdHs   RDW 13.2 10.0 - 15.0 %  FrRdHs   Platelet Count 139 150 - 450 10e9/L L FrRdHs   Diff Method Automated Method   FrRdHs   % Neutrophils 78.4 %  FrRdHs   % Lymphocytes 10.0 %  FrRdHs   % Monocytes 10.7 %  FrRdHs   % Eosinophils 0.2 %  FrRdHs   % Basophils 0.4 %  FrRdHs   % Immature Granulocytes 0.3 %  FrRdHs   Nucleated RBCs 0 0 /100  FrRdHs   Absolute Neutrophil 8.2 1.6 - 8.3 10e9/L  FrRdHs   Absolute Lymphocytes 1.0 0.8 - 5.3 10e9/L  FrRdHs   Absolute Monocytes 1.1 0.0 - 1.3 10e9/L  FrRdHs   Absolute Eosinophils 0.0 0.0 - 0.7 10e9/L  FrRdHs   Absolute Basophils 0.0 0.0 - 0.2 10e9/L  FrRdHs   Abs Immature Granulocytes 0.0 0 - 0.4 10e9/L  FrRdHs   Absolute Nucleated RBC 0.0   FrRdHs            Lactic acid whole blood [[id number]]  Resulted: 09/13/17 1007, Result status: Final result    Ordering provider: Nirali Lawrence MD  09/13/17 0846 Resulting lab: [facility]    Specimen Information    Type Source Collected  On   Blood  09/13/17 0955          Components       Value Reference Range Flag Lab   Lactic Acid 0.9 0.7 - 2.0 mmol/L  FrRdHs            UA reflex to Microscopic and Culture [[id number]] (Abnormal)  Resulted: 09/13/17 0956, Result status: Edited Result - FINAL    Ordering provider: Nirali Lawrence MD  09/13/17 0846 Resulting lab: [facility]    Specimen Information    Type Source Collected On   Midstream Urine Urine clean catch 09/13/17 0930          Components       Value Reference Range Flag Lab   Color Urine Straw   FrRdHs   Appearance Urine Clear   FrRdHs   Glucose Urine Negative NEG^Negative mg/dL  FrRdHs   Bilirubin Urine Negative NEG^Negative  FrRdHs   Ketones Urine Negative NEG^Negative mg/dL  FrRdHs   Specific Gravity Urine 1.010 1.003 - 1.035  FrRdHs   Blood Urine Negative NEG^Negative  FrRdHs   pH Urine 9.0 5.0 - 7.0 pH H FrRdHs   Protein Albumin Urine Negative NEG^Negative mg/dL  FrRdHs   Urobilinogen mg/dL 0.0 0.0 - 2.0 mg/dL  FrRdHs   Nitrite Urine Negative NEG^Negative  FrRdHs   Leukocyte Esterase Urine Trace NEG^Negative A FrRdHs   Source Midstream Urine   FrRdHs   RBC Urine 3 0 - 2 /HPF H FrRdHs   WBC Urine 7 0 - 2 /HPF H FrRdHs   Squamous Epithelial /HPF Urine <1 0 - 1 /HPF  FrRdHs   Mucous Urine Present NEG^Negative /LPF A FrRdHs            Testing Performed By     Lab - Abbreviation Name Director Address Valid Date Range    12 - FrRdHs [facility] Unknown [address] [facility] [address] 05/08/15 1057 - Present    75 - Unknown [facility] Unknown [address] [facility] [address] 01/15/15 1019 - Present    170 - Unknown POINT OF CARE TEST, GLUCOSE Unknown Unknown 10/31/11 1114 - Present               Imaging Results - 3 Days      CT Abdomen Pelvis w Contrast [[id number]]  Resulted: 09/13/17 1454, Result status: Final result    Ordering provider: Nirali Lawrence MD  09/13/17 0825 Resulted by: Meliton Forrester MD     Performed: 09/13/17 1104 - 09/13/17 1125 Resulting lab: RADIOLOGY RESULTS    Narrative:       CT ABDOMEN AND PELVIS WITH CONTRAST   9/13/2017 11:25 AM     HISTORY: Dementia, diabetes, patient fell and was found sitting in a  laundry basket and incontinent. Tenderness to palpation of lower  thoracic and upper lumbar spine at the midline.    TECHNIQUE: 85 mL Isovue-370. Radiation dose for this scan was reduced  using automated exposure control, adjustment of the mA and/or kV  according to patient size, or iterative reconstruction technique.    COMPARISON: None.    FINDINGS:   Abdomen: Thoracolumbar degenerative changes with no definite fracture.  Patchy infiltrates at the lung bases. Mild cardiomegaly with no  specific chamber enlargement. Normal liver, spleen, pancreas,  gallbladder and adrenal glands. Small bilateral renal cysts. Patchy  renal cortical thinning. There is a 0.5 cm stone in a posterior left  lower renal calyx. No adenopathy. Stomach and small bowel are  unremarkable.    Pelvis: Colonic diverticulosis. Strand-like densities adjacent to the  descending colon and slight colonic wall thickening and pelvic side  wall thickening suggests diverticulitis, but since the patient is  afebrile, the possibility of neoplasm should be considered. Atrophic  uterus and ovaries. Normal bladder.      Impression:       IMPRESSION:  1. No evidence of acute trauma.  2. Thickening of the walls of the sigmoid colon in an area of  diverticulosis with adjacent strand-like density. Differential  diagnosis includes diverticulitis and neoplasm.  3. Patchy nonspecific infiltrates at the lung bases.  4. Renal cysts.  5. Left lower pole renal calculus.    LONG MURILLO MD      Lumbar spine XR, 2-3 views [[id number]]  Resulted: 09/13/17 1418, Result status: Final result    Ordering provider: Nirali Lawrence MD  09/13/17 1304 Resulted by: Linda Dill MD    Performed: 09/13/17 1348 - 09/13/17 1405 Resulting lab:  RADIOLOGY RESULTS    Narrative:       XR LUMBAR SPINE 2-3 VIEWS  9/13/2017 2:05 PM    HISTORY:  pain, fall    COMPARISON:  None.      Impression:       IMPRESSION:  Osteopenia. No acute process identified. Very mild  multilevel degenerative changes. Degenerative facet changes at L5.  Contrast in the renal collecting system from recent CT scan.    SHAI BAILEY MD      Thoracic spine XR, 3 views [[id number]]  Resulted: 09/13/17 1417, Result status: Final result    Ordering provider: Nirali Lawrence MD  09/13/17 1304 Resulted by: Shai Bailey MD    Performed: 09/13/17 1348 - 09/13/17 1405 Resulting lab: RADIOLOGY RESULTS    Narrative:       XR THORACIC SPINE 3 VW  9/13/2017 2:05 PM    HISTORY:  pain, fall    COMPARISON:  None.      Impression:       IMPRESSION:  Significant motion on the lateral view. Multilevel  degenerative changes. Mild vertebral body height loss at several  levels but no focal significant compression fractures.     SHAI BAILEY MD      CT Head w/o Contrast [[id number]]  Resulted: 09/13/17 1300, Result status: Final result    Ordering provider: Nirali Lawrence MD  09/13/17 0846 Resulted by: Walter Stokes MD    Performed: 09/13/17 1106 - 09/13/17 1125 Resulting lab: RADIOLOGY RESULTS    Narrative:       CT OF THE HEAD WITHOUT CONTRAST September 13, 2017 11:25 AM     HISTORY: Fall, dementia.    TECHNIQUE: 5 mm thick axial CT images of the head were acquired  without IV contrast material. Radiation dose for this scan was reduced  using automated exposure control, adjustment of the mA and/or kV  according to patient size, or iterative reconstruction technique.    COMPARISON: Brain MR 4/29/2013.    FINDINGS: The study is mildly limited by patient motion. There is  moderate diffuse cerebral volume loss. There are subtle patchy areas  of decreased density in the cerebral white matter bilaterally that are  consistent with sequela of chronic small vessel ischemic disease.      The ventricles and basal cisterns are within normal limits in  configuration given the degree of cerebral volume loss. There is no  midline shift. There are no extra-axial fluid collections.     No intracranial hemorrhage, mass or recent infarct.    The visualized paranasal sinuses are well-aerated. There is no  mastoiditis. There are no fractures of the visualized bones.       Impression:       IMPRESSION: Limited study due to patient motion. Diffuse cerebral  volume loss and cerebral white matter changes consistent with chronic  small vessel ischemic disease. No evidence for acute intracranial  pathology.          LINDA STOKES MD      Cervical spine CT w/o contrast [[id number]]  Resulted: 09/13/17 1300, Result status: Final result    Ordering provider: Nirali Lawrence MD  09/13/17 0846 Resulted by: Linda Stokes MD    Performed: 09/13/17 1106 - 09/13/17 1125 Resulting lab: RADIOLOGY RESULTS    Narrative:       CT OF THE CERVICAL SPINE WITHOUT CONTRAST September 13, 2017 11:25 AM     HISTORY: Pain, fall.     TECHNIQUE: Axial images of the cervical spine were acquired without  intravenous contrast. Multiplanar reformations were created. Radiation  dose for this scan was reduced using automated exposure control,  adjustment of the mA and/or kV according to patient size, or iterative  reconstruction technique.    COMPARISON: None.    FINDINGS: The study is mildly limited by patient motion.    There is normal alignment of the cervical vertebrae. Vertebral body  heights of the cervical spine are normal. Craniocervical alignment is  normal. There are no fractures of the cervical spine.        Impression:       IMPRESSION: Mildly limited study due to patient motion. No evidence  for fracture or traumatic malalignment.      LINDA STOKES MD      XR Chest 2 Views [[id number]]  Resulted: 09/13/17 1139, Result status: Final result    Ordering provider: Nirali Lawrence MD  09/13/17 0846 Resulted by:  Shai Dill MD    Performed: 09/13/17 1119 - 09/13/17 1127 Resulting lab: RADIOLOGY RESULTS    Narrative:       XR CHEST 2 VW  9/13/2017 11:27 AM    HISTORY:  hypoxia    COMPARISON:  1/24/2016      Impression:       IMPRESSION:  Shallow inspiration. Probable scarring at the  costophrenic angles. Heart size likely upper limits of normal for  portable technique. No acute process.     SHAI DILL MD      Testing Performed By     Lab - Abbreviation Name Director Address Valid Date Range    104 - Rad Rslts RADIOLOGY RESULTS Unknown Unknown 02/16/05 1553 - Present            Encounter-Level Documents:     There are no encounter-level documents.      Order-Level Documents:     There are no order-level documents.

## 2017-09-13 NOTE — PLAN OF CARE
"Problem: Discharge Planning  Goal: Discharge Planning (Adult, OB, Behavioral, Peds)  Outcome: No Change  PRIMARY DIAGNOSIS: \"GENERIC\" NURSING. Mild UTI and Possible Diverticulitis   OUTPATIENT/OBSERVATION GOALS TO BE MET BEFORE DISCHARGE:  1. ADLs back to baseline: Yes SBA      2. Activity and level of assistance: Up with standby assistance.      3. Pain status: Pain free. Pt denies pain but previously grimaced with palpation      4. Return to near baseline physical activity: Yes     Pt confused. Pt has dementia at baseline. Pt oriented to self only. IV Flaggy and Cipro IVF NS at 75. PRN Seroquel given x1 and VPM in use. Pt very [place] and overall slightly agitated. Trop 0.106 MD called son to make aware awaiting results of second trop.           Discharge Planner Nurse   Safe discharge environment identified: Yes return to [facility] possible increase cares  Barriers to discharge: Yes       Entered by: Marii Alvarez 09/13/2017 6:52 PM            Please review provider order for any additional goals.   Nurse to notify provider when observation goals have been met and patient is ready for discharge.      "

## 2017-09-13 NOTE — IP AVS SNAPSHOT
[facility] Observation Department    [address]    [facility] [address]    Phone:  [phone]                                       After Visit Summary   9/13/2017    Lula Aguilera    MRN: [id number]           After Visit Summary Signature Page     I have received my discharge instructions, and my questions have been answered. I have discussed any challenges I see with this plan with the nurse or doctor.    ..........................................................................................................................................  Patient/Patient Representative Signature      ..........................................................................................................................................  Patient Representative Print Name and Relationship to Patient    ..................................................               ................................................  Date                                            Time    ..........................................................................................................................................  Reviewed by Signature/Title    ...................................................              ..............................................  Date                                                            Time

## 2017-09-13 NOTE — IP AVS SNAPSHOT
MRN:[id number]                      After Visit Summary   9/13/2017    Lula Aguilera    MRN: [id number]           Thank you!     Thank you for choosing [facility] for your care. Our goal is always to provide you with excellent care. Hearing back from our patients is one way we can continue to improve our services. Please take a few minutes to complete the written survey that you may receive in the mail after you visit. If you would like to speak to someone directly about your visit please contact Patient Relations at [phone]. Thank you!          Patient Information     Date Of Birth          5/1/1932        About your hospital stay     You were admitted on:  September 13, 2017 You last received care in the:  [facility] Observation Department    You were discharged on:  September 14, 2017        Reason for your hospital stay       You were admitted from [facility] care after being found in a laundry basket in the closet.  You were not found to have any fractures and no clear infectious process prior to d/c.  You need to be re-evaluated if you have any fevers, abd pain or loss of appetite.                  Who to Call     For medical emergencies, please call 911.  For non-urgent questions about your medical care, please call your primary care provider or clinic, [phone]          Attending Provider     Provider Specialty    Nirali Lawrence MD Emergency Medicine    [facility], Jerald Rangel MD Internal Medicine       Primary Care Provider Office Phone # Fax #    Brandi Agrawal [phone]      After Care Instructions     Activity       Your activity upon discharge: as tolerated            Diet       Follow this diet upon discharge: resume home diabetic diet                  Follow-up Appointments     Follow-up and recommended labs and tests        F/u with PCP as previously scheduled and prn                             Pending Results      "Date and Time Order Name Status Description    2017 0007 Urine Culture Aerobic Bacterial Preliminary             Statement of Approval     Ordered          17 1057  I have reviewed and agree with all the recommendations and orders detailed in this document.  EFFECTIVE NOW     Approved and electronically signed by:  Margarita Valerio DO             Admission Information     Date & Time Provider Department Dept. Phone    2017 Jerald Thmoas MD [facility] Observation Department [phone]      Your Vitals Were     Blood Pressure Temperature Respirations Height Pulse Oximetry       146/50 96.8  F (36  C) (Oral) 16 1.702 m (5' 7.01\") 94%       MyChart Information     [facility] lets you send messages to your doctor, view your test results, renew your prescriptions, schedule appointments and more. To sign up, go to [email or web address] . Click on \"Log in\" on the left side of the screen, which will take you to the Welcome page. Then click on \"Sign up Now\" on the right side of the page.     You will be asked to enter the access code listed below, as well as some personal information. Please follow the directions to create your username and password.     Your access code is: [email or web address]  Expires: 2017 11:59 AM     Your access code will  in 90 days. If you need help or a new code, please call your [facility] clinic or [phone].        Care EveryWhere ID     This is your Care EveryWhere ID. This could be used by other organizations to access your [facility] medical records  [id number]        Equal Access to Services     [facility] : Hadii erik fuchs Somagraux, waaxda luqadaha, qaybta kaalmada chetanyalakshmi, james yeboah. So [facility] [phone].    ATENCIÓN: Si habla español, tiene a gamble disposición servicios gratuitos de asistencia lingüística. Llame al [phone].    We comply with applicable federal civil rights laws and [place] " laws. We do not discriminate on the basis of race, color, national origin, age, disability sex, sexual orientation or gender identity.               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   Quantity:  90 tablet   Refills:  3       blood glucose monitoring test strip   Commonly known as:  no brand specified   Used for:  Type 2 diabetes, HbA1c goal < 7% (H)        Use to test blood sugars 2 times daily or as directed   Quantity:  200 each   Refills:  3       latanoprost 0.005 % ophthalmic solution   Commonly known as:  XALATAN        Dose:  1 drop   Place 1 drop into both eyes every evening   Refills:  0       LORazepam 0.5 MG tablet   Commonly known as:  ATIVAN   Used for:  Anxiety        Dose:  0.5 mg   Take 1 tablet (0.5 mg) by mouth every 6 hours as needed for anxiety   Quantity:  20 tablet   Refills:  0       metFORMIN 850 MG tablet   Commonly known as:  GLUCOPHAGE        Dose:  850 mg   Take 1 tablet (850 mg) by mouth 2 times daily (with meals)   Quantity:  60 tablet   Refills:  1       pioglitazone 30 MG tablet   Commonly known as:  ACTOS   Used for:  Type 2 diabetes mellitus with stage 3 chronic kidney disease, without long-term current use of insulin (H)        Dose:  30 mg   Take 1 tablet (30 mg) by mouth daily   Quantity:  90 tablet   Refills:  3       * SEROQUEL 25 MG tablet   Generic drug:   QUEtiapine        Dose:  12.5 mg   Take 12.5 mg by mouth daily   Refills:  0       * SEROQUEL 25 MG tablet   Generic drug:  QUEtiapine        Dose:  12.5 mg   Take 12.5 mg by mouth daily as needed   Refills:  0       STATIN NOT PRESCRIBED (INTENTIONAL)        Statin not prescribed intentionally due to Intolerance (with supporting documentation of trying a statin at least once within the last 5 years)   Quantity:  0 each   Refills:  0       vitamin D 2000 UNITS tablet        Dose:  2000 Units   Take 2,000 Units by mouth daily   Quantity:  100 tablet   Refills:  3       * Notice:  This list has 2 medication(s) that are the same as other medications prescribed for you. Read the directions carefully, and ask your doctor or other care provider to review them with you.             Protect others around you: Learn how to safely use, store and throw away your medicines at www.disposemymeds.org.             Medication List: This is a list of all your medications and when to take them. Check marks below indicate your daily home schedule. Keep this list as a reference.      Medications           Morning Afternoon Evening Bedtime As Needed    ACE/ARB NOT PRESCRIBED (INTENTIONAL)   ACE & ARB not prescribed due to Intolerance                                ACETAMINOPHEN PO   Take 650 mg by mouth every 6 hours as needed for pain                                ADVOCATE SAFETY LANCETS Misc   Use to check blood sugars twice daily and as needed                                aspirin 81 MG EC tablet   Take 1 tablet by mouth daily.   Last time this was given:  81 mg on 9/14/2017  8:13 AM                                blood glucose monitoring test strip   Commonly known as:  no brand specified   Use to test blood sugars 2 times daily or as directed                                latanoprost 0.005 % ophthalmic solution   Commonly known as:  XALATAN   Place 1 drop into both eyes every evening                                LORazepam 0.5  MG tablet   Commonly known as:  ATIVAN   Take 1 tablet (0.5 mg) by mouth every 6 hours as needed for anxiety                                metFORMIN 850 MG tablet   Commonly known as:  GLUCOPHAGE   Take 1 tablet (850 mg) by mouth 2 times daily (with meals)                                pioglitazone 30 MG tablet   Commonly known as:  ACTOS   Take 1 tablet (30 mg) by mouth daily   Last time this was given:  30 mg on 9/14/2017  8:12 AM                                * SEROQUEL 25 MG tablet   Take 12.5 mg by mouth daily   Last time this was given:  12.5 mg on 9/14/2017  8:12 AM   Generic drug:  QUEtiapine                                * SEROQUEL 25 MG tablet   Take 12.5 mg by mouth daily as needed   Last time this was given:  12.5 mg on 9/14/2017  8:12 AM   Generic drug:  QUEtiapine                                STATIN NOT PRESCRIBED (INTENTIONAL)   Statin not prescribed intentionally due to Intolerance (with supporting documentation of trying a statin at least once within the last 5 years)                                vitamin D 2000 UNITS tablet   Take 2,000 Units by mouth daily                                * Notice:  This list has 2 medication(s) that are the same as other medications prescribed for you. Read the directions carefully, and ask your doctor or other care provider to review them with you.

## 2017-09-13 NOTE — ED PROVIDER NOTES
History     Chief Complaint:  Fall    History limited by: Dementia.      Lula Aguilera is a 85 year old female with a history of dementia, diabetes, who presents to the emergency department today for evaluation of a fall. The patient lives in a nursing home where she is checked every two hours. Today, the patient was found sitting in a laundry basket, partially undressed an incontinent of urine. Here, she reports pain abdominal pain but is otherwise nonspecific in describing pain.    Allergies:  Penicillins    Medications:    Actos  Ativan  Metformin  Relafen  Latanoprost ophthalmic solution  Combigan opthalmic solution  Fosamax  Aspirin     Past Medical History:    Type 2 diabetes  Glaucoma  Hyperlipidemia  Hypertension  Dementia    Past Surgical History:    Appendectomy     Family History:    Alzheimer's disease - mother    Social History:  The patient was accompanied to the ED by EMS.  Smoking Status: Never smoker  Smokeless Tobacco: Never used  Alcohol Use: No  Marital Status:  Single [1]     Review of Systems   Unable to perform ROS: Dementia     Physical Exam   Vitals:  Patient Vitals for the past 24 hrs:   BP Temp Temp src Heart Rate Resp SpO2   09/13/17 1404 - - - - - 95 %   09/13/17 1403 - - - - - 94 %   09/13/17 1402 (!) 128/103 - - - - -   09/13/17 1310 - - - - - 94 %   09/13/17 1309 (!) 143/97 - - - - -   09/13/17 1200 102/77 - - 76 - 94 %   09/13/17 1131 - - - 79 - 96 %   09/13/17 1129 142/71 - - - - -   09/13/17 1040 - - - 82 - 96 %   09/13/17 1025 - - - 83 - 95 %   09/13/17 1015 - - - 83 - 96 %   09/13/17 1000 153/59 - - 89 - 94 %   09/13/17 0945 - - - 92 - 95 %   09/13/17 0915 - - - 97 - 94 %   09/13/17 0900 142/65 - - 106 - 94 %   09/13/17 0845 - - - 98 - 95 %   09/13/17 0832 (!) 139/94 98.4  F (36.9  C) Temporal 104 20 90 %   09/13/17 0830 - - - - - 92 %     Physical Exam  Physical Exam   General: Resting on the bed.  Head: No obvious trauma to head.  Ears, Nose, Throat:  External ears normal.   Nose normal.  No pharygeal erythema, swelling or exudate.  Midline uvula.  Both ears clear, no hemotympanum.  Eyes:  Conjunctivae and EOM are normal.  Right pupil is equal, round, and reactive. Left eye with glaucoma changes, non reactive.   Neck: Normal range of motion.  Neck supple.   CV: Regular rate and rhythm.  No murmurs.      Respiratory: Effort normal and breath sounds normal.  No wheezing or crackles.   Gastrointestinal: Soft.  No distension. General tenderness to palpation. There is no rigidity, no rebound and no guarding.   Musculoskeletal: Normal range of motion. Able to range bilateral upper and lower extremities without pain. Tenderness to palpation of low T spine/upper L spine tenderness at midline, overlying  Neuro: Alert, oriented to self. Moving all extremities appropriately.  Normal speech.    Skin: Skin is warm and dry.  No rash noted.     Emergency Department Course     ECG:  ECG taken at 0913, ECG read at 0921  Normal sinus rhythm  Left anterior fascicular block  Anterolateral infarct, age undetermined  aVL appears largely unchanged  Abnormal ECG  Rate 100 bpm. OK interval 194. QRS duration 100. QT/QTc 366/472. P-R-T axes 53 -57 72.    Imaging:  Radiology findings were communicated with the patient and family who voiced understanding of the findings.    XR chest 2 views  Shallow inspiration. Probable scarring at the  costophrenic angles. Heart size likely upper limits of normal for  portable technique. No acute process.   Reading per radiology    Lumbar spine XR, 2-3 views  Osteopenia. No acute process identified. Very mild  multilevel degenerative changes. Degenerative facet changes at L5.  Contrast in the renal collecting system from recent CT scan.  Reading per radiology    Thoracic spine XR, 3 views  Significant motion on the lateral view. Multilevel  degenerative changes. Mild vertebral body height loss at several  levels but no focal significant compression fractures.   Reading per  radiology    Cervical spine CT w/o contrast  Mildly limited study due to patient motion. No evidence  for fracture or traumatic malalignment.  Reading per radiology    CT Abdomen Pelvis w contrast  1. No evidence of acute trauma.  2. Thickening of the walls of the sigmoid colon in an area of  diverticulosis with adjacent strand-like density. Differential  diagnosis includes diverticulitis and neoplasm.  3. Patchy nonspecific infiltrates at the lung bases.  4. Renal cysts.  5. Left lower pole renal calculus.  Reading per radiology    CT Head w/o contrast   Limited study due to patient motion. Diffuse cerebral  volume loss and cerebral white matter changes consistent with chronic  small vessel ischemic disease. No evidence for acute intracranial  pathology.    Reading per radiology    Laboratory:  Laboratory findings were communicated with the patient and family who voiced understanding of the findings.    CBC:  (L) o/w WNL. (WBC 10.4, HGB 11.8)   Troponin (Collected 0955): 0.106 (H)  Lactic Acid (Collected at 0955): 0.9  CMP: Glucose 148 (H), Creatinine 1.1 (H), GFR 47 (L) o/w WNL  Lipase: 232    UA with micro: pH 9.0 (H), leukocyte esterase urine trace (A), RBC/HPF 3 (H), WBC/HPF 7 (H), Mucous present (A) o/w negative    Interventions:  1156 aspirin 325 mg PO  1322 Zofran 4 mg IV  1432 Flagyl 500 mg IV  1510 Cipro 400 mg IV     Emergency Department Course:  Nursing notes and vitals reviewed.  I performed an exam of the patient as documented above.   IV was inserted and blood was drawn for laboratory testing, results above.  The patient provided a urine sample here in the emergency department. This was sent for laboratory testing, findings above.  The patient was sent for an XR chest 2 views, Lumbar spine XR, 2-3 views, Thoracic spine XR, 3 views, Cervical spine CT w/o contrast, CT Abdomen Pelvis w contrast, and CT Head w/o contrast while in the emergency department, results above.   At 1223 the patient was  rechecked and family was updated on the results of laboratory and imaging studies.   At 1410 the patient was rechecked and was updated on the results of laboratory and imaging studies.   I discussed the treatment plan with the patient. They expressed understanding of this plan and consented to admission. I discussed the patient with Dr. Thomas, who will admit the patient to a monitored bed for further evaluation and treatment.  I personally reviewed the laboratory and imaging results with the patient and family and answered all related questions prior to admission.    Impression & Plan      Medical Decision Making:  Lula Aguilera is a 85 year old female with a history of dementia presenting with unclear fall. Patient was found in a basket as noted above. Vitals reassuring, mild tachycardia resolved. Broad differential pursued, including but not limited to, mechanical fall, subdural intracranial hemorrhage, contusion, TBI, acute coronary syndrome, pulmonary embolism, pneumonia, or infection, acute abdominal process, urinary tract infection, etc. Very difficult history obtained as patient has underlying dementia and is unable to give a much of a history. Initial EKG was reviewed showing no signs of acute ST-t wave changes, aVL appears unchanged. She does have a small troponin bump at 0.106. In reviewing prior history, it appears that she has had a troponin elevation in the past as well but not this high. Difficult to ascertain if patient is having active chest pain, she denies any. BMP shows CKD, creatinine at baseline. LFTs unremarkable. Lipase unremarkable. Lactate reassuring for any acute intraabdominal process. Urinalysis does appear concerning for mild urinary tract infection. Imaging of her head and cervical spine was obtained.     Imaging returned with negative head and neck images. CT of the abdomen does show thickening of the sigmoid colon, possible diverticulitis versus neoplasm. Nonspecific infiltrates in  the lung bases. These are not visible on the chest xray. She is saturating well without any cough to suggest pneumonia at this time. Xray of the T and L spines negative. Urinalysis does show evidence of mild urinary tract infection. Plan to treat for diverticulitis and urinary tract infection.    Had lengthy discussion with son about elevated troponin. Son feels that patient will benefit more from going home than staying in the hospital. Plan to check a repeat troponin to see if down trending. If down trending, this seems appropriate for discharge home. There are no ST changes or ischemic changes on EKG to suggest ACS at this time.  Discussed risks and benefits of this plan including that the patient may die or have a heart attack at the nursing home. He voices understanding of these risks and feels the benefits of returning home exceeds the risk.  Troponin recheck returned elevated at 0.116. I discussed these results with the patient's son and he agreed to admission for management and care.  Unclear etiology of troponin elevation thus feel admission and serial troponins would be of benefit.   hospitalist kindly accepted admission.  Stable under my care.      Diagnosis:    ICD-10-CM    1. Diverticulitis of colon K57.32    2. Abdominal pain, generalized R10.84    3. Elevated troponin R74.8        Disposition:   Admission    Scribe Disclosure:  IMarck, am serving as a scribe at 8:43 AM on 9/13/2017 to document services personally performed by Niarli Lawrence MD, based on my observations and the provider's statements to me.    9/13/2017   [facility] EMERGENCY DEPARTMENT       Nirali Lawrence MD  09/13/17 [id number]

## 2017-09-13 NOTE — H&P
CHIEF COMPLAINT:  Patient unable to provide given profound dementia.  She was brought into the hospital after being found sitting in a laundry basket in the closet at her care facility.      HISTORY OF PRESENT ILLNESS:  The patient is an 85-year-old female with a history of significant dementia who resides at a care center.  She apparently was found by Care Center Staff sitting in a laundry basket in her closet, partially undressed and incontinent of urine.  She lives in a nursing home where she is checked on every 2 hours.  Per the EMS notes that I read apparently the patient's mental status was at baseline with no other significant problems.  She was apparently complaining of pain initially on her arrival in the Emergency Department, specifically abdominal pain.      On arrival to the Emergency Department the patient's vital signs included blood pressure 140/95 with heart rate of 100, afebrile, O2 saturations 95% on 2 liters of oxygen.        EMERGENCY DEPARTMENT LABORATORY AND IMAGING RESULTS including basic laboratory and imaging studies.     1.  Urinalysis shows 7 white blood cells/hpf and trace leukocyte esterase.     2.  Lactic acid normal.     3.  White cell count normal, platelet count 140,000.     4.  Lipase normal.     5.  Creatinine 1.1.     6.  Liver function tests normal.     7.  Troponin initially 0.106, follow-up 0.116.   8.  EKG was personally reviewed by me showing left anterior fascicular block with poor R-wave progression in anterolateral leads, no acute ST changes present.   9.  Abdominopelvic CT scan shows thickening of the walls of the sigmoid colon in an area of diverticulosis with adjacent strand-like density.  Differential diagnosis includes diverticulitis and neoplasm.     10.  Lumbar x-ray with no acute findings.     11.  Thoracic spine x-ray with no acute findings.     12.  Head CT scan with no acute findings.     13.  C-spine CT scan with no evidence of fracture.     14.  Chest x-ray  with no acute findings.        As requested by the Emergency Department Physician the patient is being admitted to Observation given her elevated troponin.      When I see the patient she has no complaints at all.  She does not know she is in the hospital and does not recall getting an evaluation or CT imaging.  She is a bit cantankerous and wants to go home.      PAST MEDICAL HISTORY:   1.  Dementia, resides in a care center where she is checked every 2 hours.   2.  Hyperlipidemia.   3.  Hypertension.   4.  Diabetes mellitus.      CURRENT MEDICATIONS (awaiting Pharmacy reconciliation, but appear to include the following):   1.  Acetaminophen.   2.  Alendronate.   3.  Aspirin.   4.  Eyedrops.   5.  Vitamin D.   6.  Ativan p.r.n.   7.  Metformin.   8.  Relafen.   9.  Actos.      DRUG ALLERGIES:  Penicillin.      FAMILY HISTORY:  I am unable to obtain this due to the patient's dementia.      SOCIAL HISTORY:  I am unable to obtain this due to the patient's dementia.  Per review of records from the care facility she is DNR/DNI.      REVIEW OF SYSTEMS:  Unreliable due to the patient's dementia, but she currently denies any symptoms.      PHYSICAL EXAMINATION:   VITAL SIGNS:  Blood pressure currently 127/59, heart rate 65, afebrile, O2 saturations 93% on 2 liters of oxygen.   GENERAL:  The patient appears nontoxic and in no acute distress.  When I enter the room her eyes are closed, and she appears to be sleeping.  She is awakened by my voice.  She appears to be very hard of hearing.  When I speak loudly she is able to answer questions.  She does not recall why she is in the hospital or the events earlier today.  She initially did not know she was in the hospital.   HEENT:  Head is atraumatic.  Sclerae are white.  Eyelids are normal.  Conjunctivae are normal.  Extraocular movements are intact.   NECK:  Supple with no cervical or supraclavicular lymphadenopathy.   HEART:  Regular rate and rhythm, no significant murmurs and  no lower extremity edema.   LUNGS:  Clear to auscultation bilaterally with no intercostal retractions and no conversational dyspnea.   ABDOMEN:  There is mild tenderness to palpation, more on the left than the right side of the abdomen, but no rebound, guarding or organomegaly.   EXTREMITIES:  No edema.   SKIN:  No rash or jaundice, dry to touch.   NEUROLOGIC:  Cranial nerves II-XII are intact.  She moves all extremities appropriately.  Sensation is  intact to light touch in the upper and lower extremities bilaterally.   PSYCHIATRIC:  The patient is a bit cantankerous.  When I was examining her she slapped me on the arm and threatened to kick me.  Obviously there is confusion present related to her underlying dementia.      LABORATORY AND IMAGING DATA:  Reviewed above in History of Present Illness.      IMPRESSION AND PLAN:  The patient is an 85-year-old female with a history of what appears to be profound dementia.  She is brought to the hospital today after being found sitting in a laundry basket in her closet at her care facility on routine check of her room by Staff.  She apparently was complaining of abdominal pain on presentation to the Emergency Department, and this prompted a work-up.  Work-up included a troponin enzyme that was mildly elevated.   CT imaging demonstrated what appears to be possible diverticulitis, although neoplasm is not excluded.  It also appears she may have a mild urinary tract infection.  According to EMS notes the Nursing Staff at her care facility felt that her mental status was near her normal baseline.        1.  Mild troponin enzyme elevation:  This does not appear consistent with acute coronary syndrome.  Of note she did have an admission for chest pain in 2015, and at that time her Provider discussed with the patient's son further work-up with stress testing.  The family was not interested in any invasive interventions, so stress testing was not performed.   a.  We will check  another troponin enzyme.     b.  If there is no significant elevation beyond what we are seeing now I would not anticipate any further work-up for this.     2.  Abdominal pain with possible diverticulitis.  Neoplasm is not excluded.     a.  We will treat with ciprofloxacin and Flagyl.     b.  Family is not currently here to discuss the CT scan results, but would consider a discussion with them and the Rounding Physician tomorrow to let them know the possible diagnoses with them.   3.  Severe dementia.   4.  Diabetes mellitus history.     a.  Hold metformin in the setting of a CT scan with contrast which she received today.   4.  Disposition.  The patient will be admitted to Observation status with discharge likely tomorrow.         STANISLAW HOOKS MD             D: 2017 15:43   T: 2017 18:49   MT: CORNELIO#[id number]      Name:     LUCIANA SWEET   MRN:      [id number]        Account:      [id number]   :      1932           Admitted:     [id number]      Document: [id number]       cc: Brandi Agrawal MD

## 2017-09-13 NOTE — ED NOTES
Pt placed on 2 LPM oxygen via NC for levels below 92% on RA intermittently. Pt pulls at tubing, but tolerates the tubing in her mouth.

## 2017-09-13 NOTE — PHARMACY-ADMISSION MEDICATION HISTORY
Admission medication history interview status for this patient is complete. See [facility] admission navigator for allergy information, prior to admission medications and immunization status.     Medication history interview source(s):Caregiver  Medication history resources (including written lists, pill bottles, clinic record):[facility] medication list  Primary pharmacy:[facility]    Changes made to \[facility] [place] [facility]\"" medication list:  Added: Seroquel  Deleted: Fosamax, combigan, relafen  Changed: None    Actions taken by pharmacist (provider contacted, etc):Called [facility] to confirm last doses and medications     Additional medication history information:None    Medication reconciliation/reorder completed by provider prior to medication history? No    Do you take OTC medications (eg tylenol, ibuprofen, fish oil, eye/ear drops, etc)? Y    Prior to Admission medications    Medication Sig Last Dose Taking? Auth Provider   QUEtiapine (SEROQUEL) 25 MG tablet Take 12.5 mg by mouth daily 9/12/2017 at 0830 Yes Unknown, Entered By History   pioglitazone (ACTOS) 30 MG tablet Take 1 tablet (30 mg) by mouth daily 9/12/2017 at 0830 Yes Brandi Agrawal MD   metFORMIN (GLUCOPHAGE) 850 MG tablet Take 1 tablet (850 mg) by mouth 2 times daily (with meals) 9/12/2017 at 1700 Yes Brandi Agrawal MD   Cholecalciferol (VITAMIN D) 2000 UNITS tablet Take 2,000 Units by mouth daily 9/12/2017 at 0830 Yes Brandi Agrawal MD   latanoprost (XALATAN) 0.005 % ophthalmic solution Place 1 drop into both eyes every evening  9/12/2017 at 1700 Yes Unknown, Entered By History   aspirin 81 MG EC tablet Take 1 tablet by mouth daily. 9/12/2017 at 0830 Yes Brandi Agrawal MD   QUEtiapine (SEROQUEL) 25 MG tablet Take 12.5 mg by mouth daily as needed Unknown at Unknown time  Unknown, Entered By History   LORazepam (ATIVAN) 0.5 MG tablet Take 1 tablet (0.5 mg) by mouth every 6 hours as needed for anxiety Unknown at Unknown time   Brandi Agrawal MD   STATIN NOT PRESCRIBED, INTENTIONAL, Statin not prescribed intentionally due to Intolerance (with supporting documentation of trying a statin at least once within the last 5 years)   Brandi Agrawal MD   ACE/ARB NOT PRESCRIBED, INTENTIONAL, ACE & ARB not prescribed due to Intolerance   Brandi Agrawal MD   ACETAMINOPHEN PO Take 650 mg by mouth every 6 hours as needed for pain Unknown at Unknown time  Reported, Patient   ADVOCATE SAFETY LANCETS MISC Use to check blood sugars twice daily and as needed   Brandi Agrawal MD   blood glucose monitoring (NO BRAND SPECIFIED) test strip Use to test blood sugars 2 times daily or as directed   Brandi Agrawal MD

## 2017-09-13 NOTE — ED NOTES
Bed: ED13  Expected date: 9/13/17  Expected time: 8:17 AM  Means of arrival: Ambulance  Comments:  BV 2

## 2017-09-14 VITALS
OXYGEN SATURATION: 94 % | RESPIRATION RATE: 16 BRPM | SYSTOLIC BLOOD PRESSURE: 146 MMHG | DIASTOLIC BLOOD PRESSURE: 50 MMHG | TEMPERATURE: 96.8 F | HEIGHT: 67 IN

## 2017-09-14 LAB
ALBUMIN SERPL-MCNC: 3.4 G/DL (ref 3.4–5)
ALP SERPL-CCNC: 67 U/L (ref 40–150)
ALT SERPL W P-5'-P-CCNC: 17 U/L (ref 0–50)
ANION GAP SERPL CALCULATED.3IONS-SCNC: 8 MMOL/L (ref 3–14)
AST SERPL W P-5'-P-CCNC: 17 U/L (ref 0–45)
BASOPHILS # BLD AUTO: 0.1 10E9/L (ref 0–0.2)
BASOPHILS NFR BLD AUTO: 0.6 %
BILIRUB SERPL-MCNC: 0.5 MG/DL (ref 0.2–1.3)
BUN SERPL-MCNC: 21 MG/DL (ref 7–30)
CALCIUM SERPL-MCNC: 8.9 MG/DL (ref 8.5–10.1)
CHLORIDE SERPL-SCNC: 102 MMOL/L (ref 94–109)
CO2 SERPL-SCNC: 27 MMOL/L (ref 20–32)
CREAT SERPL-MCNC: 1.2 MG/DL (ref 0.52–1.04)
DIFFERENTIAL METHOD BLD: NORMAL
EOSINOPHIL # BLD AUTO: 0.3 10E9/L (ref 0–0.7)
EOSINOPHIL NFR BLD AUTO: 3.6 %
GFR SERPL CREATININE-BSD FRML MDRD: 43 ML/MIN/1.7M2
GLUCOSE BLDC GLUCOMTR-MCNC: 120 MG/DL (ref 70–99)
GLUCOSE SERPL-MCNC: 161 MG/DL (ref 70–99)
IMM GRANULOCYTES # BLD: 0 10E9/L (ref 0–0.4)
IMM GRANULOCYTES NFR BLD: 0.3 %
LYMPHOCYTES # BLD AUTO: 1.7 10E9/L (ref 0.8–5.3)
LYMPHOCYTES NFR BLD AUTO: 19.7 %
MONOCYTES # BLD AUTO: 0.8 10E9/L (ref 0–1.3)
MONOCYTES NFR BLD AUTO: 9.4 %
NEUTROPHILS # BLD AUTO: 5.8 10E9/L (ref 1.6–8.3)
NEUTROPHILS NFR BLD AUTO: 66.4 %
NRBC # BLD AUTO: 0 10*3/UL
NRBC BLD AUTO-RTO: 0 /100
POTASSIUM SERPL-SCNC: 3.7 MMOL/L (ref 3.4–5.3)
PROT SERPL-MCNC: 7.2 G/DL (ref 6.8–8.8)
SODIUM SERPL-SCNC: 137 MMOL/L (ref 133–144)
WBC # BLD AUTO: 8.7 10E9/L (ref 4–11)

## 2017-09-14 PROCEDURE — 96366 THER/PROPH/DIAG IV INF ADDON: CPT | Mod: 59

## 2017-09-14 PROCEDURE — 25000128 H RX IP 250 OP 636: Performed by: INTERNAL MEDICINE

## 2017-09-14 PROCEDURE — 99217 ZZC OBSERVATION CARE DISCHARGE: CPT | Performed by: INTERNAL MEDICINE

## 2017-09-14 PROCEDURE — 85048 AUTOMATED LEUKOCYTE COUNT: CPT | Performed by: INTERNAL MEDICINE

## 2017-09-14 PROCEDURE — 85004 AUTOMATED DIFF WBC COUNT: CPT | Performed by: INTERNAL MEDICINE

## 2017-09-14 PROCEDURE — 80048 BASIC METABOLIC PNL TOTAL CA: CPT | Performed by: INTERNAL MEDICINE

## 2017-09-14 PROCEDURE — 00000146 ZZHCL STATISTIC GLUCOSE BY METER IP

## 2017-09-14 PROCEDURE — 25000132 ZZH RX MED GY IP 250 OP 250 PS 637: Performed by: INTERNAL MEDICINE

## 2017-09-14 PROCEDURE — 25000125 ZZHC RX 250: Performed by: INTERNAL MEDICINE

## 2017-09-14 PROCEDURE — 80053 COMPREHEN METABOLIC PANEL: CPT | Performed by: INTERNAL MEDICINE

## 2017-09-14 PROCEDURE — G0378 HOSPITAL OBSERVATION PER HR: HCPCS

## 2017-09-14 PROCEDURE — 96368 THER/DIAG CONCURRENT INF: CPT

## 2017-09-14 PROCEDURE — 36415 COLL VENOUS BLD VENIPUNCTURE: CPT | Performed by: INTERNAL MEDICINE

## 2017-09-14 RX ORDER — LATANOPROST 50 UG/ML
1 SOLUTION/ DROPS OPHTHALMIC EVERY EVENING
Status: DISCONTINUED | OUTPATIENT
Start: 2017-09-14 | End: 2017-09-14 | Stop reason: HOSPADM

## 2017-09-14 RX ORDER — PIOGLITAZONEHYDROCHLORIDE 30 MG/1
30 TABLET ORAL DAILY
Status: DISCONTINUED | OUTPATIENT
Start: 2017-09-14 | End: 2017-09-14 | Stop reason: HOSPADM

## 2017-09-14 RX ORDER — ASPIRIN 81 MG/1
81 TABLET ORAL DAILY
Status: DISCONTINUED | OUTPATIENT
Start: 2017-09-14 | End: 2017-09-14 | Stop reason: HOSPADM

## 2017-09-14 RX ADMIN — ASPIRIN 81 MG: 81 TABLET, COATED ORAL at 08:13

## 2017-09-14 RX ADMIN — PIOGLITAZONE 30 MG: 30 TABLET ORAL at 08:12

## 2017-09-14 RX ADMIN — METRONIDAZOLE 500 MG: 500 INJECTION, SOLUTION INTRAVENOUS at 05:39

## 2017-09-14 RX ADMIN — CIPROFLOXACIN 400 MG: 2 INJECTION, SOLUTION INTRAVENOUS at 04:32

## 2017-09-14 RX ADMIN — QUETIAPINE FUMARATE 12.5 MG: 25 TABLET, FILM COATED ORAL at 08:12

## 2017-09-14 NOTE — PLAN OF CARE
Problem: Discharge Planning  Goal: Discharge Planning (Adult, OB, Behavioral, Peds)  Outcome: No Change  PRIMARY DIAGNOSIS: Mild UTI and Possible Diverticulitis   OUTPATIENT/OBSERVATION GOALS TO BE MET BEFORE DISCHARGE:  1. ADLs back to baseline: Yes SBA      2. Activity and level of assistance: Up with A-1.      3. Pain status: patient did grimace while palpating stomach otherwise no pain noted.       4. Return to near baseline physical activity: Yes     Patient VSS, Alert to self. Pt has dementia at baseline. IV Cipro started. Patient has been sleeping very will this shift, PRN Seroquel given x1 on pm shift. Pt very [place], sitter at bedside for the night.  All 3 trops are slightly elevated. Patient did get up 1 x to use the bathroom. Patient was able to void but was also incontinent.  Will continue to monitor, assess, and offer supportive care.  Discharge Planner Nurse   Safe discharge environment identified: Yes return to [facility] possible increase cares  Barriers to discharge: Yes       Entered by: Bruna Dc 09/14/2017 4:54 AM            Please review provider order for any additional goals.   Nurse to notify provider when observation goals have been met and patient is ready for discharge.

## 2017-09-14 NOTE — PLAN OF CARE
"Problem: Discharge Planning  Goal: Discharge Planning (Adult, OB, Behavioral, Peds)  Outcome: No Change  Problem: Discharge Planning  Goal: Discharge Planning (Adult, OB, Behavioral, Peds)  Outcome: No Change  PRIMARY DIAGNOSIS: \"GENERIC\" NURSING. Mild UTI and Possible Diverticulitis   OUTPATIENT/OBSERVATION GOALS TO BE MET BEFORE DISCHARGE:  1. ADLs back to baseline: Yes SBA      2. Activity and level of assistance: Up with standby assistance.      3. Pain status: Pain free. Pt denies pain but previously grimaced with palpation      4. Return to near baseline physical activity: Yes      Pt confused. Pt has  Severe dementia at baseline. Pt oriented to self only. IV Flaggy and Cipro IVF NS at 75. PRN Seroquel given x1 and VPM in use. Pt very [place] and overall slightly agitated. Trop 0.116, repeat 0.110 MD called son to make aware.     ROOM # [id number]     Living Situation (if not independent, order SW consult): [facility]  Facility name:  : [facility]      Activity level at baseline: SBA  Activity level on admit: SBA        Patient registered to observation; given Patient Bill of Rights; given the opportunity to ask questions about observation status and their plan of care.  Patient has been oriented to the observation room, bathroom and call light is in place.     Discussed discharge goals and expectations with patient/family.-SON at 1930                Discharge Planner Nurse   Safe discharge environment identified: Yes return to [facility] possible increase cares  Barriers to discharge: Yes       Entered by: Marii Alvarez 09/13/2017 6:52 PM              Please review provider order for any additional goals.   Nurse to notify provider when observation goals have been met and patient is ready for discharge.         "

## 2017-09-14 NOTE — PROGRESS NOTES
DC'd VPM because patient is too hard of hearing to be successful with this form of safety.  Sitter was ordered instead to keep the patient safe.

## 2017-09-14 NOTE — PROGRESS NOTES
Problem: Discharge Planning  Goal: Discharge Planning (Adult, OB, Behavioral, Peds)  Outcome: No Change  PRIMARY DIAGNOSIS: Mild UTI and Possible Diverticulitis   OUTPATIENT/OBSERVATION GOALS TO BE MET BEFORE DISCHARGE:  1. ADLs back to baseline: Yes, SBA for safety      2. Activity and level of assistance: Up with SBA      3. Pain status: Pt denies pain       4. Return to near baseline physical activity: Yes      A/O to self only. Cooperative with cares. [place]. VSS. Denies pain/nausea. Up with SBA for safety. BS audible and active. Voiding - can be incontinent at times. Tolerating mod carb diet - . Will continue to monitor.     Discharge Planner Nurse   Safe discharge environment identified: Yes return to [facility] possible increase cares  Barriers to discharge: No    Please review provider order for any additional goals.   Nurse to notify provider when observation goals have been met and patient is ready for discharge.

## 2017-09-14 NOTE — DISCHARGE SUMMARY
[facility]    Discharge Summary  Hospitalist    Date of Admission:  9/13/2017  Date of Discharge:  9/14/2017  Provider:  Margarita Valerio DO    Discharge Diagnoses   1.  Episode of increased confusion with h/o profound dementia  2.  Mild troponin elevation    Other medical issues:  Past Medical History:   Diagnosis Date     DM type 2 (diabetes mellitus, type 2) (H) 1990's     Glaucoma      Hyperlipidemia LDL goal < 100      Hypertension goal BP (blood pressure) < 130/80        History of Present Illness   Lula Aguilera is an 85 year old female who presented from memory care after being found incontinent of urine in a laundry basket in her room.  Please see the admission history and physical for full details.    Hospital Course   Lula Aguilera was admitted on 9/13/2017.  The following problems were addressed during her hospitalization:    1.  Acute on chronic confusion  Ms. Aguilera presents from memory care with concern for increased confusion.  She was found in a laundry basket in her closet in her room and was incontinent of urine.  She was brought into the ED where there was no s/s of acute fractures on imaging of the cervical/thoracic/lulmbar spine and no UTI on UA.  Head CT without any acute abnormalities.    She was at her baseline mentation per son in the room at time of d/c.  He had no concerns about her returning to her memory care setting.    2.  Abnormalities on CT of the abd/pelvis  There was question of possible diverticulitis vs. neoplasm on CT scan of the abd/pelvis on admission.  She had no fevers or leukocytosis.  No clear c/o abdominal pain and appetite was good (she ate all of her dinner and breakfast).  Discussed at length with family at bedside.  I think that it is reasonable to not give her abx because there is not a clear clinical infection.  They do not want any invasive procedures, as well.    3.  Troponin elevation, mild  Trending down on serial lab checks.  No clear CP  and she appears comfortable.  Family not interested in any further testing.    4.  DM  No changes in her home diet (diabetic) or meds at time of discharge.    Significant Results and Procedures     Pending Results   Unresulted Labs Ordered in the Past 30 Days of this Admission     Date and Time Order Name Status Description    9/14/2017 0007 Urine Culture Aerobic Bacterial Preliminary           Code Status   DNR / DNI       Primary Care Physician   Brandi Agrawal    Physical Exam   Temp: 96.8  F (36  C) Temp src: Oral BP: 145/51   Heart Rate: 62 Resp: 16 SpO2: 91 % O2 Device: None (Room air) Oxygen Delivery: 2 LPM  There were no vitals filed for this visit.  Vital Signs with Ranges  Temp:  [96.1  F (35.6  C)-97.8  F (36.6  C)] 96.8  F (36  C)  Heart Rate:  [62-79] 62  Resp:  [16-20] 16  BP: (102-175)/() 145/51  SpO2:  [91 %-96 %] 91 %     PT SEEN AND EXAMINED ON DAY OF D/C  GEN:  Alert, oriented to self only, comfortable at rest  HEENT:  Normocephalic/atraumatic, PERRL, no scleral icterus, no nasal discharge, mouth moist  CV:  Regular rate and rhythm, no loud murmur to ausc.  S1 + S2 noted, no S3 or S4.  LUNGS:  Clear to auscultation ant/lat bilaterally.  No rales/rhonchi/wheezing auscultated bilaterally.  No costal retractions bilaterally.  Symmetric chest rise on inhalation noted.  ABD:  Active bowel sounds, soft, non-tender, mildly distended.  No clear rebound/guarding/rigidity.  No masses palpated.  No obvious HSM to exam.  EXT:  No edema or cyanosis bilaterally. No joint synovitis noted.  No calf-tenderness or asymmetry noted.  SKIN:  Dry to touch, no rashes or jaundice noted.  PSYCH:  Mood appropriate, Not tearful or depressed.  Not agitated  NEURO:  No tremors at rest, memory poor.  Alert and repetitive    Discharge Disposition   Discharged to memory care    Consultations This Hospital Stay   SOCIAL WORK IP CONSULT    Time Spent on this Encounter   I, Margarita Valerio, personally saw  the patient today and spent greater than 30 minutes discharging this patient.    Discharge Orders     Reason for your hospital stay   You were admitted from memory care after being found in a laundry basket in the closet.  You were not found to have any fractures and no clear infectious process prior to d/c.  You need to be re-evaluated if you have any fevers, abd pain or loss of appetite.     Follow-up and recommended labs and tests    F/u with PCP as previously scheduled and prn     Activity   Your activity upon discharge: as tolerated     DNR/DNI     Diet   Follow this diet upon discharge: resume home diabetic diet       Discharge Medications   Current Discharge Medication List      CONTINUE these medications which have NOT CHANGED    Details   !! QUEtiapine (SEROQUEL) 25 MG tablet Take 12.5 mg by mouth daily      pioglitazone (ACTOS) 30 MG tablet Take 1 tablet (30 mg) by mouth daily  Qty: 90 tablet, Refills: 3    Associated Diagnoses: Type 2 diabetes mellitus with stage 3 chronic kidney disease, without long-term current use of insulin (H)      metFORMIN (GLUCOPHAGE) 850 MG tablet Take 1 tablet (850 mg) by mouth 2 times daily (with meals)  Qty: 60 tablet, Refills: 1      Cholecalciferol (VITAMIN D) 2000 UNITS tablet Take 2,000 Units by mouth daily  Qty: 100 tablet, Refills: 3      latanoprost (XALATAN) 0.005 % ophthalmic solution Place 1 drop into both eyes every evening       aspirin 81 MG EC tablet Take 1 tablet by mouth daily.  Qty: 90 tablet, Refills: 3    Associated Diagnoses: Type 2 diabetes, HbA1c goal < 7% (H)      !! QUEtiapine (SEROQUEL) 25 MG tablet Take 12.5 mg by mouth daily as needed      LORazepam (ATIVAN) 0.5 MG tablet Take 1 tablet (0.5 mg) by mouth every 6 hours as needed for anxiety  Qty: 20 tablet, Refills: 0    Associated Diagnoses: Anxiety      STATIN NOT PRESCRIBED, INTENTIONAL, Statin not prescribed intentionally due to Intolerance (with supporting documentation of trying a statin at  least once within the last 5 years)  Qty: 0 each, Refills: 0      ACE/ARB NOT PRESCRIBED, INTENTIONAL, ACE & ARB not prescribed due to Intolerance      ACETAMINOPHEN PO Take 650 mg by mouth every 6 hours as needed for pain      ADVOCATE SAFETY LANCETS MISC Use to check blood sugars twice daily and as needed  Qty: 2 Box, Refills: 3    Associated Diagnoses: Type 2 diabetes mellitus with stage 3 chronic kidney disease (H)      blood glucose monitoring (NO BRAND SPECIFIED) test strip Use to test blood sugars 2 times daily or as directed  Qty: 200 each, Refills: 3    Associated Diagnoses: Type 2 diabetes, HbA1c goal < 7% (H)       !! - Potential duplicate medications found. Please discuss with provider.        Allergies   Allergies   Allergen Reactions     Penicillins      Data     Recent Labs  Lab 09/13/17  0955   WBC 10.4   HGB 11.8   HCT 35.0   MCV 94   *       Recent Labs  Lab 09/13/17  0955      POTASSIUM 3.9   CHLORIDE 102   CO2 27   ANIONGAP 9   *   BUN 26   CR 1.10*   GFRESTIMATED 47*   GFRESTBLACK 57*   WADE 8.9       Recent Labs  Lab 09/13/17  0955      POTASSIUM 3.9   CHLORIDE 102   CO2 27   ANIONGAP 9   *   BUN 26   CR 1.10*   GFRESTIMATED 47*   GFRESTBLACK 57*   WADE 8.9   PROTTOTAL 7.0   ALBUMIN 3.4   BILITOTAL 0.5   ALKPHOS 63   AST 17   ALT 13       Recent Labs  Lab 09/13/17  1835 09/13/17  1318 09/13/17  0955   TROPI 0.110* 0.116* 0.106*       Recent Labs  Lab 09/13/17  0930   COLOR Straw   APPEARANCE Clear   URINEGLC Negative   URINEBILI Negative   URINEKETONE Negative   SG 1.010   UBLD Negative   URINEPH 9.0*   PROTEIN Negative   NITRITE Negative   LEUKEST Trace*   RBCU 3*   WBCU 7*     Results for orders placed or performed during the hospital encounter of 09/13/17   XR Chest 2 Views    Narrative    XR CHEST 2 VW  9/13/2017 11:27 AM    HISTORY:  hypoxia    COMPARISON:  1/24/2016      Impression    IMPRESSION:  Shallow inspiration. Probable scarring at the  costophrenic  angles. Heart size likely upper limits of normal for  portable technique. No acute process.     SHAI BAILEY MD   CT Head w/o Contrast    Narrative    CT OF THE HEAD WITHOUT CONTRAST September 13, 2017 11:25 AM     HISTORY: Fall, dementia.    TECHNIQUE: 5 mm thick axial CT images of the head were acquired  without IV contrast material. Radiation dose for this scan was reduced  using automated exposure control, adjustment of the mA and/or kV  according to patient size, or iterative reconstruction technique.    COMPARISON: Brain MR 4/29/2013.    FINDINGS: The study is mildly limited by patient motion. There is  moderate diffuse cerebral volume loss. There are subtle patchy areas  of decreased density in the cerebral white matter bilaterally that are  consistent with sequela of chronic small vessel ischemic disease.     The ventricles and basal cisterns are within normal limits in  configuration given the degree of cerebral volume loss. There is no  midline shift. There are no extra-axial fluid collections.     No intracranial hemorrhage, mass or recent infarct.    The visualized paranasal sinuses are well-aerated. There is no  mastoiditis. There are no fractures of the visualized bones.       Impression    IMPRESSION: Limited study due to patient motion. Diffuse cerebral  volume loss and cerebral white matter changes consistent with chronic  small vessel ischemic disease. No evidence for acute intracranial  pathology.          LINDA SMITH MD   CT Abdomen Pelvis w Contrast    Narrative    CT ABDOMEN AND PELVIS WITH CONTRAST   9/13/2017 11:25 AM     HISTORY: Dementia, diabetes, patient fell and was found sitting in a  laundry basket and incontinent. Tenderness to palpation of lower  thoracic and upper lumbar spine at the midline.    TECHNIQUE: 85 mL Isovue-370. Radiation dose for this scan was reduced  using automated exposure control, adjustment of the mA and/or kV  according to patient size, or iterative  reconstruction technique.    COMPARISON: None.    FINDINGS:   Abdomen: Thoracolumbar degenerative changes with no definite fracture.  Patchy infiltrates at the lung bases. Mild cardiomegaly with no  specific chamber enlargement. Normal liver, spleen, pancreas,  gallbladder and adrenal glands. Small bilateral renal cysts. Patchy  renal cortical thinning. There is a 0.5 cm stone in a posterior left  lower renal calyx. No adenopathy. Stomach and small bowel are  unremarkable.    Pelvis: Colonic diverticulosis. Strand-like densities adjacent to the  descending colon and slight colonic wall thickening and pelvic side  wall thickening suggests diverticulitis, but since the patient is  afebrile, the possibility of neoplasm should be considered. Atrophic  uterus and ovaries. Normal bladder.      Impression    IMPRESSION:  1. No evidence of acute trauma.  2. Thickening of the walls of the sigmoid colon in an area of  diverticulosis with adjacent strand-like density. Differential  diagnosis includes diverticulitis and neoplasm.  3. Patchy nonspecific infiltrates at the lung bases.  4. Renal cysts.  5. Left lower pole renal calculus.    LONG MURILLO MD   Cervical spine CT w/o contrast    Narrative    CT OF THE CERVICAL SPINE WITHOUT CONTRAST September 13, 2017 11:25 AM     HISTORY: Pain, fall.     TECHNIQUE: Axial images of the cervical spine were acquired without  intravenous contrast. Multiplanar reformations were created. Radiation  dose for this scan was reduced using automated exposure control,  adjustment of the mA and/or kV according to patient size, or iterative  reconstruction technique.    COMPARISON: None.    FINDINGS: The study is mildly limited by patient motion.    There is normal alignment of the cervical vertebrae. Vertebral body  heights of the cervical spine are normal. Craniocervical alignment is  normal. There are no fractures of the cervical spine.        Impression    IMPRESSION: Mildly limited study  due to patient motion. No evidence  for fracture or traumatic malalignment.      LINDA SMITH MD   Thoracic spine XR, 3 views    Narrative    XR THORACIC SPINE 3 VW  9/13/2017 2:05 PM    HISTORY:  pain, fall    COMPARISON:  None.      Impression    IMPRESSION:  Significant motion on the lateral view. Multilevel  degenerative changes. Mild vertebral body height loss at several  levels but no focal significant compression fractures.     SHAI BAILEY MD   Lumbar spine XR, 2-3 views    Narrative    XR LUMBAR SPINE 2-3 VIEWS  9/13/2017 2:05 PM    HISTORY:  pain, fall    COMPARISON:  None.      Impression    IMPRESSION:  Osteopenia. No acute process identified. Very mild  multilevel degenerative changes. Degenerative facet changes at L5.  Contrast in the renal collecting system from recent CT scan.    SHAI BAILEY MD

## 2017-09-14 NOTE — PROGRESS NOTES
Patient's After Visit Summary was reviewed with patient and/or son.   Patient verbalized understanding of After Visit Summary, recommended follow up and was given an opportunity to ask questions.   Discharge medications sent home with patient/family: Not applicable, no new medications   Discharged with son.

## 2017-09-14 NOTE — PLAN OF CARE
"Problem: Discharge Planning  Goal: Discharge Planning (Adult, OB, Behavioral, Peds)  Outcome: No Change  PRIMARY DIAGNOSIS: \"GENERIC\" NURSING. Mild UTI and Possible Diverticulitis   OUTPATIENT/OBSERVATION GOALS TO BE MET BEFORE DISCHARGE:  1. ADLs back to baseline: Yes SBA      2. Activity and level of assistance: Up with standby assistance.      3. Pain status: appears Pain free, patient currently sleeping      4. Return to near baseline physical activity: Yes     Patinet's VSS, Alert to self.  Pt has dementia at baseline. IVF NS at 75. PRN Seroquel given x1. Pt very [place] and overall slightly agitated, sitter at bedside for the night. VPM was discontinued d/t its ineffectiveness since patient is very [place].  All 3 trops are slightly elevated. Will continue to monitor, assess, and offer supportive care.  Discharge Planner Nurse   Safe discharge environment identified: Yes return to [facility] possible increase cares  Barriers to discharge: Yes       Entered by: Bruna Dc 09/14/2017 1:26 AM            Please review provider order for any additional goals.   Nurse to notify provider when observation goals have been met and patient is ready for discharge.      "

## 2017-09-15 LAB
BACTERIA SPEC CULT: NORMAL
BACTERIA SPEC CULT: NORMAL
Lab: NORMAL
SPECIMEN SOURCE: NORMAL

## 2017-10-18 ENCOUNTER — RECORDS - HEALTHEAST (OUTPATIENT)
Dept: LAB | Facility: CLINIC | Age: 82
End: 2017-10-18

## 2017-10-19 LAB — HBA1C MFR BLD: 6.3 % (ref 4.2–6.1)

## 2018-03-16 ENCOUNTER — MEDICAL CORRESPONDENCE (OUTPATIENT)
Dept: HEALTH INFORMATION MANAGEMENT | Facility: CLINIC | Age: 83
End: 2018-03-16

## 2018-04-11 ENCOUNTER — APPOINTMENT (OUTPATIENT)
Dept: GENERAL RADIOLOGY | Facility: CLINIC | Age: 83
End: 2018-04-11
Attending: EMERGENCY MEDICINE
Payer: COMMERCIAL

## 2018-04-11 ENCOUNTER — HOSPITAL ENCOUNTER (EMERGENCY)
Facility: CLINIC | Age: 83
Discharge: HOME | End: 2018-04-11
Attending: EMERGENCY MEDICINE | Admitting: EMERGENCY MEDICINE
Payer: COMMERCIAL

## 2018-04-11 ENCOUNTER — APPOINTMENT (OUTPATIENT)
Dept: CT IMAGING | Facility: CLINIC | Age: 83
End: 2018-04-11
Attending: EMERGENCY MEDICINE
Payer: COMMERCIAL

## 2018-04-11 VITALS
RESPIRATION RATE: 18 BRPM | DIASTOLIC BLOOD PRESSURE: 78 MMHG | TEMPERATURE: 98 F | SYSTOLIC BLOOD PRESSURE: 135 MMHG | OXYGEN SATURATION: 96 %

## 2018-04-11 DIAGNOSIS — S80.00XA CONTUSION OF KNEE, INITIAL ENCOUNTER: ICD-10-CM

## 2018-04-11 DIAGNOSIS — S01.81XA LACERATION OF FOREHEAD, INITIAL ENCOUNTER: ICD-10-CM

## 2018-04-11 PROCEDURE — 12014 RPR F/E/E/N/L/M 5.1-7.5 CM: CPT

## 2018-04-11 PROCEDURE — 99285 EMERGENCY DEPT VISIT HI MDM: CPT | Mod: 25

## 2018-04-11 PROCEDURE — 73562 X-RAY EXAM OF KNEE 3: CPT | Mod: 50

## 2018-04-11 PROCEDURE — 72072 X-RAY EXAM THORAC SPINE 3VWS: CPT

## 2018-04-11 PROCEDURE — 70450 CT HEAD/BRAIN W/O DYE: CPT

## 2018-04-11 RX ORDER — LIDOCAINE HYDROCHLORIDE AND EPINEPHRINE 10; 10 MG/ML; UG/ML
INJECTION, SOLUTION INFILTRATION; PERINEURAL
Status: DISCONTINUED
Start: 2018-04-11 | End: 2018-04-11 | Stop reason: HOSPADM

## 2018-04-11 RX ORDER — GINSENG 100 MG
CAPSULE ORAL
Status: DISCONTINUED
Start: 2018-04-11 | End: 2018-04-11 | Stop reason: HOSPADM

## 2018-04-11 NOTE — ED AVS SNAPSHOT
[facility] Emergency Department    [address]    [facility] [address]    Phone:  [phone]    Fax:  [phone]                                       Lula Aguilera   MRN: [id number]    Department:  [facility] Emergency Department   Date of Visit:  4/11/2018           After Visit Summary Signature Page     I have received my discharge instructions, and my questions have been answered. I have discussed any challenges I see with this plan with the nurse or doctor.    ..........................................................................................................................................  Patient/Patient Representative Signature      ..........................................................................................................................................  Patient Representative Print Name and Relationship to Patient    ..................................................               ................................................  Date                                            Time    ..........................................................................................................................................  Reviewed by Signature/Title    ...................................................              ..............................................  Date                                                            Time

## 2018-04-11 NOTE — ED AVS SNAPSHOT
[facility] Emergency Department    [address] [place] [address]    Phone:  [phone]    Fax:  [phone]                                       Lula Aguilera   MRN: [id number]    Department:  [facility] Emergency Department   Date of Visit:  4/11/2018           Patient Information     Date Of Birth          5/1/1932        Your diagnoses for this visit were:     Laceration of forehead, initial encounter     Contusion of knee, initial encounter        You were seen by Wolf Worthington MD.      Follow-up Information     Follow up with Brandi Agrawal MD. Go in 1 week.    Specialty:  Pediatrics    Why:  For suture removal.  sutures can be removed by [facility] care doctor    Contact information:    [address] [place] [address] [address] [place] [address]  [phone]          Discharge Instructions         Ice and tylenol for pain    Bruises (Contusions)    A contusion is a bruise. A bruise happens when a blow to your body doesn't break the skin but does break blood vessels beneath the skin. Blood leaking from the broken vessels causes redness and swelling. As it heals, your bruise is likely to turn colors like purple, green, and yellow. This is normal. The bruise should fade in 2 or 3 weeks.  Factors that make you more likely to bruise  Almost everyone bruises now and then. Certain people do bruise more easily than others. You're more prone to bruising as you get older. That's because blood vessels become more fragile with age. You're also more likely to bruise if you have a clotting disorder such as hemophilia or take medications that reduce clotting, including aspirin, coumadin, newer agents.  When to go to the emergency room (ER)  Bruises almost always heal on their own without special treatment. But for some people, a bad bruise can be serious. Seek medical care if you:    Have a clotting disorder such as hemophilia.    Have cirrhosis or other serious liver  disease.    Take blood-thinning medications such as warfarin (Coumadin).  What to expect in the ER  A doctor will examine your bruise and ask about any health conditions you have. In some cases, you may have a test to check how well your blood clots. Other treatment will depend on your needs.  Follow-up care  Sometimes a bruise gets worse instead of better. It may become larger and more swollen. This can occur when your body walls off a small pool of blood under the skin (hematoma). In very rare cases, your doctor may need to drain excess blood from the area.  Tip:  Apply an ice pack or bag of frozen peas to a bruise (keep a thin cloth between the cold source and your skin). This can help reduce redness and swelling.   Date Last Reviewed: 12/1/2016 2000-2017 [employer]. [address] [place] [address]. All rights reserved. This information is not intended as a substitute for professional medical care. Always follow your healthcare professional's instructions.        Face Laceration: Stitches or Tape  A laceration is a cut through the skin. This will require stitches if it is deep. Minor cuts may be treated with surgical tape.    Home care    Your healthcare provider may prescribe an antibiotic. This is to help prevent infection. Follow all instructions for taking this medicine. Take the medicine every day until it is gone or you are told to stop. You should not have any left over.    The healthcare provider may prescribe medicines for pain. Follow instructions for taking them.    Follow the healthcare provider s instructions on how to care for the cut.    Wash your hands with soap and warm water before and after caring for the cut. This helps prevent infection.    If a bandage was applied and it becomes wet or dirty, replace it. Otherwise, leave it in place for the first 24 hours, then change it once a day or as directed.    If sutures were used, clean the wound daily:    After removing  the bandage, wash the area with soap and water. Use a wet cotton swab to loosen and remove any blood or crust that forms.    After cleaning, keep the wound clean and dry. Talk with your healthcare provider before applying any antibiotic ointment to the wound. Reapply a fresh bandage.    You may remove the bandage to shower as usual after the first 24 hours, but don't soak the area in water (no swimming) until the sutures are removed.    If surgical tape was used, keep the area clean and dry. If it becomes wet, blot it dry with a towel.    Most facial skin wounds heal without problems. However, an infection sometimes occurs despite proper treatment. Therefore, watch for the signs of infection listed below.  Follow-up care  Follow up with your healthcare provider as advised. Be sure to return for removal of the stitches as directed. Ask your provider how long stitches should remain in place. If surgical tape closures were used, you may remove them yourself when your provider recommends if they have not fallen off on their own.  When to seek medical advice  Call your healthcare provider right away if any of these occur:    Wound bleeding not controlled by direct pressure    Signs of infection, including increasing pain in the wound, increasing wound redness or swelling, or pus or bad odor coming from the wound    Fever of 100.4 F (38 C) or higher or as directed by your healthcare provider    Stitches come apart or fall out or surgical tape falls off before 5 days    Wound edges re-open    Wound changes colors    Numbness around the wound   Date Last Reviewed: 7/1/2017 2000-2017 [employer]. [address] [place] [address], [place] [address]. All rights reserved. This information is not intended as a substitute for professional medical care. Always follow your healthcare professional's instructions.          24 Hour Appointment Hotline       To make an appointment at any [facility], call [phone]  ([phone]). If you don't have a family doctor or clinic, we will help you find one. [facility] clinics are conveniently located to serve the needs of you and your family.             Review of your medicines      Our records show that you are taking the medicines listed below. If these are incorrect, please call your family doctor or clinic.        Dose / Directions Last dose taken    ACE/ARB/ARNI NOT PRESCRIBED (INTENTIONAL)        ACE & ARB not prescribed due to Intolerance   Refills:  0        ACETAMINOPHEN PO   Dose:  650 mg        Take 650 mg by mouth every 6 hours as needed for pain   Refills:  0        ADVOCATE SAFETY LANCETS Misc   Quantity:  2 Box        Use to check blood sugars twice daily and as needed   Refills:  3        aspirin 81 MG EC tablet   Dose:  81 mg   Quantity:  90 tablet        Take 1 tablet by mouth daily.   Refills:  3        blood glucose monitoring test strip   Commonly known as:  no brand specified   Quantity:  200 each        Use to test blood sugars 2 times daily or as directed   Refills:  3        latanoprost 0.005 % ophthalmic solution   Commonly known as:  XALATAN   Dose:  1 drop        Place 1 drop into both eyes every evening   Refills:  0        LORazepam 0.5 MG tablet   Commonly known as:  ATIVAN   Dose:  0.5 mg   Quantity:  20 tablet        Take 1 tablet (0.5 mg) by mouth every 6 hours as needed for anxiety   Refills:  0        metFORMIN 850 MG tablet   Commonly known as:  GLUCOPHAGE   Dose:  850 mg   Quantity:  60 tablet        Take 1 tablet (850 mg) by mouth 2 times daily (with meals)   Refills:  1        pioglitazone 30 MG tablet   Commonly known as:  ACTOS   Dose:  30 mg   Quantity:  90 tablet        Take 1 tablet (30 mg) by mouth daily   Refills:  3        * SEROQUEL 25 MG tablet   Dose:  12.5 mg   Generic drug:  QUEtiapine        Take 12.5 mg by mouth daily   Refills:  0        * SEROQUEL 25 MG tablet   Dose:  12.5 mg   Generic drug:  QUEtiapine        Take 12.5 mg  by mouth daily as needed   Refills:  0        STATIN NOT PRESCRIBED (INTENTIONAL)   Quantity:  0 each        Statin not prescribed intentionally due to Intolerance (with supporting documentation of trying a statin at least once within the last 5 years)   Refills:  0        vitamin D 2000 units tablet   Dose:  2000 Units   Quantity:  100 tablet        Take 2,000 Units by mouth daily   Refills:  3        * Notice:  This list has 2 medication(s) that are the same as other medications prescribed for you. Read the directions carefully, and ask your doctor or other care provider to review them with you.            Procedures and tests performed during your visit     CT Head w/o Contrast    Thoracic spine XR, 3 views    XR Knee Bilateral 3 Views      Orders Needing Specimen Collection     None      Pending Results     No orders found from 4/9/2018 to 4/12/2018.            Pending Culture Results     No orders found from 4/9/2018 to 4/12/2018.            Pending Results Instructions     If you had any lab results that were not finalized at the time of your Discharge, you can call the ED Lab Result RN at [phone]. You will be contacted by this team for any positive Lab results or changes in treatment. The nurses are available 7 days a week from 10A to 6:30P.  You can leave a message 24 hours per day and they will return your call.        Test Results From Your Hospital Stay        4/11/2018  7:14 PM      Narrative     CT SCAN OF THE HEAD WITHOUT CONTRAST   4/11/2018 6:52 PM     HISTORY: Fall.     TECHNIQUE:  Axial images of the head and coronal reformations without  IV contrast material. Radiation dose for this scan was reduced using  automated exposure control, adjustment of the mA and/or kV according  to patient size, or iterative reconstruction technique.    COMPARISON: 9/13/2017.    FINDINGS: There is no evidence of intracranial hemorrhage, mass, acute  infarct or anomaly. There is generalized atrophy of the brain.  There  is low attenuation in the white matter of the cerebral hemispheres  consistent with sequelae of small vessel ischemic disease.     Right frontal scalp soft tissue swelling without underlying fracture.  The visualized paranasal sinuses and mastoid air cells appear clear.        Impression     IMPRESSION:     1. No evidence of acute intracranial hemorrhage, mass, or herniation.  2. There is generalized atrophy of the brain. White matter changes are  present in the cerebral hemispheres that are consistent with small  vessel ischemic disease in this age patient.   3. Right frontal scalp soft tissue injury without underlying fracture.      ASHLEY WEBSTER MD         4/11/2018  8:04 PM      Narrative     THORACIC SPINE THREE VIEWS   4/11/2018 7:36 PM     HISTORY: Fall.    COMPARISON: 9/13/2017.        Impression     IMPRESSION: The bones appear osteopenic. Slightly exaggerated kyphotic  curvature of the thoracic spine. No definite new loss of vertebral  body height. Multilevel degenerative changes present with osteophytic  spurring and loss of intervertebral disc space.    ASHLEY WEBSTER MD         4/11/2018  8:04 PM      Narrative     KNEE BILATERAL THREE VIEWS  4/11/2018 7:38 PM     HISTORY: Fall.    COMPARISON: 6/12/2016.        Impression     IMPRESSION:     Right knee: Bones are markedly osteopenic. No lucent fracture line  identified. Mild tricompartmental degenerative changes.  Chondrocalcinosis. No joint effusion. Sunrise view is unremarkable.    Left knee: Bones are markedly osteopenic. No lucent fracture line  identified. Moderate tricompartmental degenerative changes. Loss of  femoral tibial joint space greatest along the medial aspect.  Chondrocalcinosis. No joint effusion. Sunrise view is unremarkable.    ASHLEY WEBSTER MD                Clinical Quality Measure: Blood Pressure Screening     Your blood pressure was checked while you were in the emergency department today. The last reading we obtained was   "BP: 135/78 . Please read the guidelines below about what these numbers mean and what you should do about them.  If your systolic blood pressure (the top number) is less than 120 and your diastolic blood pressure (the bottom number) is less than 80, then your blood pressure is normal. There is nothing more that you need to do about it.  If your systolic blood pressure (the top number) is 120-139 or your diastolic blood pressure (the bottom number) is 80-89, your blood pressure may be higher than it should be. You should have your blood pressure rechecked within a year by a primary care provider.  If your systolic blood pressure (the top number) is 140 or greater or your diastolic blood pressure (the bottom number) is 90 or greater, you may have high blood pressure. High blood pressure is treatable, but if left untreated over time it can put you at risk for heart attack, stroke, or kidney failure. You should have your blood pressure rechecked by a primary care provider within the next 4 weeks.  If your provider in the emergency department today gave you specific instructions to follow-up with your doctor or provider even sooner than that, you should follow that instruction and not wait for up to 4 weeks for your follow-up visit.        Thank you for choosing [facility]       Thank you for choosing [facility] for your care. Our goal is always to provide you with excellent care. Hearing back from our patients is one way we can continue to improve our services. Please take a few minutes to complete the written survey that you may receive in the mail after you visit with us. Thank you!        [employer] Information     [employer] lets you send messages to your doctor, view your test results, renew your prescriptions, schedule appointments and more. To sign up, go to [email or web address] . Click on \"Log in\" on the left side of the screen, which will take you to the Welcome page. Then click on \"Sign up Now\" on the right side of " the page.     You will be asked to enter the access code listed below, as well as some personal information. Please follow the directions to create your username and password.     Your access code is: [email or web address]  Expires: 7/10/2018  8:43 PM     Your access code will  in 90 days. If you need help or a new code, please call your [place] clinic or [phone].        Care EveryWhere ID     This is your Care EveryWhere ID. This could be used by other organizations to access your [facility] [place] medical records  [id number]        Equal Access to Services     [facility]: Nehemiah Sandoval, josé manuel barba, marcos samuel, james chu . So [facility] [phone].    ATENCIÓN: Si habla español, tiene a gamble disposición servicios gratuitos de asistencia lingüística. Llame al [phone].    We comply with applicable federal civil rights laws and [place] laws. We do not discriminate on the basis of race, color, national origin, age, disability, sex, sexual orientation, or gender identity.            After Visit Summary       This is your record. Keep this with you and show to your community pharmacist(s) and doctor(s) at your next visit.

## 2018-04-11 NOTE — ED NOTES
Bed: ED28  Expected date: 4/11/18  Expected time: 6:04 PM  Means of arrival: Ambulance  Comments:  [id number]

## 2018-04-11 NOTE — ED PROVIDER NOTES
History     Chief Complaint:    Fall      HPI   Lula Aguilera is a 85 year old female with a history of type 2 diabetes, hypertension, and hyperlipidemia, who presents to the ED today after a fall. The patient was brought in by EMS after she had an unwitnessed fall at her memory care center. Per EMS report, she was walking in the hallway when she fell down and was not down for very long. She had no loss of consciousness per the memory care staff. She had hit the right side of her head during this fall and has bruising and abrasions to both knees. Of note, the patient is unable to provide a full story or history or answer any questions.     Allergies:  Penicillins      Medications:    Seroquel   Actos   Ativan   Metformin   Statin   Xalatan      Past Medical History:    Type 2 diabetes   Glaucoma   Hyperlipidemia   Hypertension     Past Surgical History:    Appendectomy     Family History:    Alzheimer's disease - mother     Social History:  Marital Status: single   Presents to the ED per EMS with family en route   Tobacco Use: never smoker   Alcohol Use: no   PCP: Brandi Agrawal     Review of Systems   Unable to perform ROS: Age       Physical Exam   First Vitals:  BP: 135/78  Heart Rate: 85  Temp: 98  F (36.7  C)  Resp: 18  SpO2: 96 %      Physical Exam   HENT:   Head:         GEN- alert, cooperative  HEENT- PERRL, EOMI, MMM, oral pharynx without abnormalities, no dental injuries, midface stable, TM's clear bilaterally. 2 right forehead lacerations, top at 2.5cm in length, bottom is 5cm. There is surrounding ecchymosis  NECK- ROM, soft, supple, no midline C spine tenderness to palpation, no abrasions  RESP- CTAB, no w/r/r, chest wall nontender, no crepitus, symmetrical chest wall movement  CV- RRR, no m/r/g  ABD- soft, NT/ND, +BS  MSK- normal ROM in all extremities, pelvis stable to AP and lateral compression, Mid to upper T spine patient seems to grimace with palpation, 5/5 strength in all  extremities  NEURO- GCS 15, speech normal, alert, yelling on exam, doesn't answer most question.  Confused at baseline  SKIN- no rash  PSYCH- confused and combative at times    Emergency Department Course   Imaging:  Xray thoracic spine, 3 views   The bones appear osteopenic. Slightly exaggerated kyphotic  curvature of the thoracic spine. No definite new loss of vertebral  body height. Multilevel degenerative changes present with osteophytic  spurring and loss of intervertebral disc space.  Preliminary radiology read.        Xray bilateral knee, 3 views   Right knee: Bones are markedly osteopenic. No lucent fracture line  identified. Mild tricompartmental degenerative changes.  Chondrocalcinosis. No joint effusion. Sunrise view is unremarkable.  Left knee: Bones are markedly osteopenic. No lucent fracture line  identified. Moderate tricompartmental degenerative changes. Loss of  femoral tibial joint space greatest along the medial aspect.  Chondrocalcinosis. No joint effusion. Sunrise view is unremarkable.  Preliminary radiology read.       CT head w/o contrast   1. No evidence of acute intracranial hemorrhage, mass, or herniation.  2. There is generalized atrophy of the brain. White matter changes are  present in the cerebral hemispheres that are consistent with small  vessel ischemic disease in this age patient.   3. Right frontal scalp soft tissue injury without underlying fracture.  Report per radiology.    Radiographic findings were communicated with the patient and family who voiced understanding of the findings.    Procedures:     Laceration Repair      LACERATION:  A simple clean 2.5 cm laceration and a 5 cm laceration.    LOCATION:  Parallel to each other on right forehead.    FUNCTION:  Distally sensation and circulation are intact.    ANESTHESIA:  Local using lidocaine 1% with epinephrine    PREPARATION:  Irrigation with Normal Saline.    DEBRIDEMENT:  no debridement.    CLOSURE:  Wound was closed with  One Layer.  Skin closed with 5x 5.0 Prolene using interrupted sutures on the 2.5cm laceration. Sutures on the lower 5 cm laceration were not counted since the patient was extremely uncooperative.        Emergency Department Course:  Nursing notes and vitals reviewed.  (1821) I performed an exam of the patient as documented above.    The patient was sent for a thoracic spine, and knee xray and a head CT while in the emergency department, findings above.    (2002) I performed a laceration repair on the patient, as noted above. Of note, the patient was extremely uncooperative during this procedure.   (2041) I rechecked on the patient and updated them on results.  The patient's son feels comfortable with taking the patient home at this time.   Findings and plan explained to the patient's son. Patient discharged home with instructions regarding supportive care, medications, and reasons to return. The importance of close follow-up was reviewed.  Impression & Plan    Medical Decision Making:  Lula Aguilera is a 85 year old female who presents after a fall at [facility]. Patient is unable to give any history. It appears that the patient was walking when she fell. There are two parallel forehead lacerations on the right, which were repaired as described above. CT head was negative for injury. She does have bruising on her knees, which were negative on the X-rays. She appears to wince or grimace when we checked her midline T spine so xray was performed and that was negative. Her son is here now and is comfortable with taking her home. I only asked him to use ice and Tylenol for now and he is comfortable with that. They do have a Nurse Practitioner that comes to her nursing home and he will have her take out the stitched after 7 days.       Diagnosis:    ICD-10-CM    1. Laceration of forehead, initial encounter S01.81XA    2. Contusion of knee, initial encounter S80.00XA        Disposition:  discharged to home      I,  Radha Lobo, am serving as a scribe on 4/11/2018 at 6:21 PM to personally document services performed by Dr. Worthington based on my observations and the provider's statements to me.    4/11/2018   [facility] EMERGENCY DEPARTMENT       Wolf Worthington MD  04/11/18 0080

## 2018-04-12 NOTE — DISCHARGE INSTRUCTIONS
Ice and tylenol for pain    Bruises (Contusions)    A contusion is a bruise. A bruise happens when a blow to your body doesn't break the skin but does break blood vessels beneath the skin. Blood leaking from the broken vessels causes redness and swelling. As it heals, your bruise is likely to turn colors like purple, green, and yellow. This is normal. The bruise should fade in 2 or 3 weeks.  Factors that make you more likely to bruise  Almost everyone bruises now and then. Certain people do bruise more easily than others. You're more prone to bruising as you get older. That's because blood vessels become more fragile with age. You're also more likely to bruise if you have a clotting disorder such as hemophilia or take medications that reduce clotting, including aspirin, coumadin, newer agents.  When to go to the emergency room (ER)  Bruises almost always heal on their own without special treatment. But for some people, a bad bruise can be serious. Seek medical care if you:    Have a clotting disorder such as hemophilia.    Have cirrhosis or other serious liver disease.    Take blood-thinning medications such as warfarin (Coumadin).  What to expect in the ER  A doctor will examine your bruise and ask about any health conditions you have. In some cases, you may have a test to check how well your blood clots. Other treatment will depend on your needs.  Follow-up care  Sometimes a bruise gets worse instead of better. It may become larger and more swollen. This can occur when your body walls off a small pool of blood under the skin (hematoma). In very rare cases, your doctor may need to drain excess blood from the area.  Tip:  Apply an ice pack or bag of frozen peas to a bruise (keep a thin cloth between the cold source and your skin). This can help reduce redness and swelling.   Date Last Reviewed: 12/1/2016 2000-2017 [employer] [address] [facility], [place] [address]. All rights reserved. This  information is not intended as a substitute for professional medical care. Always follow your healthcare professional's instructions.        Face Laceration: Stitches or Tape  A laceration is a cut through the skin. This will require stitches if it is deep. Minor cuts may be treated with surgical tape.    Home care    Your healthcare provider may prescribe an antibiotic. This is to help prevent infection. Follow all instructions for taking this medicine. Take the medicine every day until it is gone or you are told to stop. You should not have any left over.    The healthcare provider may prescribe medicines for pain. Follow instructions for taking them.    Follow the healthcare provider s instructions on how to care for the cut.    Wash your hands with soap and warm water before and after caring for the cut. This helps prevent infection.    If a bandage was applied and it becomes wet or dirty, replace it. Otherwise, leave it in place for the first 24 hours, then change it once a day or as directed.    If sutures were used, clean the wound daily:    After removing the bandage, wash the area with soap and water. Use a wet cotton swab to loosen and remove any blood or crust that forms.    After cleaning, keep the wound clean and dry. Talk with your healthcare provider before applying any antibiotic ointment to the wound. Reapply a fresh bandage.    You may remove the bandage to shower as usual after the first 24 hours, but don't soak the area in water (no swimming) until the sutures are removed.    If surgical tape was used, keep the area clean and dry. If it becomes wet, blot it dry with a towel.    Most facial skin wounds heal without problems. However, an infection sometimes occurs despite proper treatment. Therefore, watch for the signs of infection listed below.  Follow-up care  Follow up with your healthcare provider as advised. Be sure to return for removal of the stitches as directed. Ask your provider how long  stitches should remain in place. If surgical tape closures were used, you may remove them yourself when your provider recommends if they have not fallen off on their own.  When to seek medical advice  Call your healthcare provider right away if any of these occur:    Wound bleeding not controlled by direct pressure    Signs of infection, including increasing pain in the wound, increasing wound redness or swelling, or pus or bad odor coming from the wound    Fever of 100.4 F (38 C) or higher or as directed by your healthcare provider    Stitches come apart or fall out or surgical tape falls off before 5 days    Wound edges re-open    Wound changes colors    Numbness around the wound   Date Last Reviewed: 7/1/2017 2000-2017 [employer] [address] [place] [address], [place] [address]. All rights reserved. This information is not intended as a substitute for professional medical care. Always follow your healthcare professional's instructions.

## 2018-04-25 ENCOUNTER — RECORDS - HEALTHEAST (OUTPATIENT)
Dept: LAB | Facility: CLINIC | Age: 83
End: 2018-04-25

## 2018-04-25 LAB — HBA1C MFR BLD: 6.4 % (ref 4.2–6.1)

## 2018-08-01 ENCOUNTER — RECORDS - HEALTHEAST (OUTPATIENT)
Dept: LAB | Facility: CLINIC | Age: 83
End: 2018-08-01

## 2018-08-01 LAB
ANION GAP SERPL CALCULATED.3IONS-SCNC: 11 MMOL/L (ref 5–18)
BUN SERPL-MCNC: 23 MG/DL (ref 8–28)
CALCIUM SERPL-MCNC: 9.8 MG/DL (ref 8.5–10.5)
CHLORIDE BLD-SCNC: 102 MMOL/L (ref 98–107)
CO2 SERPL-SCNC: 26 MMOL/L (ref 22–31)
CREAT SERPL-MCNC: 1.31 MG/DL (ref 0.6–1.1)
GFR SERPL CREATININE-BSD FRML MDRD: 38 ML/MIN/1.73M2
GLUCOSE BLD-MCNC: 111 MG/DL (ref 70–125)
HGB BLD-MCNC: 12.4 G/DL (ref 12–16)
POTASSIUM BLD-SCNC: 3.9 MMOL/L (ref 3.5–5)
SODIUM SERPL-SCNC: 139 MMOL/L (ref 136–145)
TSH SERPL DL<=0.005 MIU/L-ACNC: 0.3 UIU/ML (ref 0.3–5)

## 2018-08-02 LAB — HBA1C MFR BLD: 6.7 % (ref 4.2–6.1)

## 2018-08-15 ENCOUNTER — RECORDS - HEALTHEAST (OUTPATIENT)
Dept: LAB | Facility: CLINIC | Age: 83
End: 2018-08-15

## 2018-08-15 LAB
ANION GAP SERPL CALCULATED.3IONS-SCNC: 10 MMOL/L (ref 5–18)
BUN SERPL-MCNC: 28 MG/DL (ref 8–28)
CALCIUM SERPL-MCNC: 9.6 MG/DL (ref 8.5–10.5)
CHLORIDE BLD-SCNC: 103 MMOL/L (ref 98–107)
CO2 SERPL-SCNC: 26 MMOL/L (ref 22–31)
CREAT SERPL-MCNC: 1.19 MG/DL (ref 0.6–1.1)
GFR SERPL CREATININE-BSD FRML MDRD: 43 ML/MIN/1.73M2
GLUCOSE BLD-MCNC: 138 MG/DL (ref 70–125)
HGB BLD-MCNC: 12 G/DL (ref 12–16)
POTASSIUM BLD-SCNC: 3.9 MMOL/L (ref 3.5–5)
SODIUM SERPL-SCNC: 139 MMOL/L (ref 136–145)

## 2018-08-16 LAB — HBA1C MFR BLD: 6.5 % (ref 4.2–6.1)

## 2018-10-23 ENCOUNTER — RECORDS - HEALTHEAST (OUTPATIENT)
Dept: LAB | Facility: CLINIC | Age: 83
End: 2018-10-23

## 2018-10-25 LAB — HBA1C MFR BLD: 7 % (ref 4.2–6.1)

## 2018-11-16 ENCOUNTER — RECORDS - HEALTHEAST (OUTPATIENT)
Dept: LAB | Facility: CLINIC | Age: 83
End: 2018-11-16

## 2018-11-16 LAB
ALBUMIN UR-MCNC: NEGATIVE MG/DL
APPEARANCE UR: CLEAR
BILIRUB UR QL STRIP: NEGATIVE
COLOR UR AUTO: YELLOW
GLUCOSE UR STRIP-MCNC: NEGATIVE MG/DL
HGB UR QL STRIP: NEGATIVE
KETONES UR STRIP-MCNC: NEGATIVE MG/DL
LEUKOCYTE ESTERASE UR QL STRIP: NEGATIVE
NITRATE UR QL: NEGATIVE
PH UR STRIP: 5.5 [PH] (ref 4.5–8)
SP GR UR STRIP: 1.01 (ref 1–1.03)
UROBILINOGEN UR STRIP-ACNC: NORMAL

## 2019-01-30 ENCOUNTER — RECORDS - HEALTHEAST (OUTPATIENT)
Dept: LAB | Facility: CLINIC | Age: 84
End: 2019-01-30

## 2019-01-30 LAB
ALBUMIN UR-MCNC: ABNORMAL MG/DL
APPEARANCE UR: ABNORMAL
BACTERIA #/AREA URNS HPF: ABNORMAL HPF
BILIRUB UR QL STRIP: NEGATIVE
COLOR UR AUTO: YELLOW
GLUCOSE UR STRIP-MCNC: NEGATIVE MG/DL
HGB UR QL STRIP: NEGATIVE
KETONES UR STRIP-MCNC: NEGATIVE MG/DL
LEUKOCYTE ESTERASE UR QL STRIP: ABNORMAL
MUCOUS THREADS #/AREA URNS LPF: ABNORMAL LPF
NITRATE UR QL: NEGATIVE
PH UR STRIP: 5.5 [PH] (ref 4.5–8)
RBC #/AREA URNS AUTO: ABNORMAL HPF
SP GR UR STRIP: 1.02 (ref 1–1.03)
SQUAMOUS #/AREA URNS AUTO: >100 LPF
UROBILINOGEN UR STRIP-ACNC: ABNORMAL
WBC #/AREA URNS AUTO: ABNORMAL HPF

## 2019-01-31 LAB — BACTERIA SPEC CULT: NO GROWTH

## 2019-03-27 ENCOUNTER — HOSPITAL ENCOUNTER (EMERGENCY)
Facility: CLINIC | Age: 84
Discharge: NURSING, REHAB OR LONG-TERM CARE | End: 2019-03-27
Attending: EMERGENCY MEDICINE | Admitting: EMERGENCY MEDICINE
Payer: COMMERCIAL

## 2019-03-27 ENCOUNTER — APPOINTMENT (OUTPATIENT)
Dept: CT IMAGING | Facility: CLINIC | Age: 84
End: 2019-03-27
Attending: EMERGENCY MEDICINE
Payer: COMMERCIAL

## 2019-03-27 VITALS
RESPIRATION RATE: 14 BRPM | HEART RATE: 79 BPM | OXYGEN SATURATION: 96 % | SYSTOLIC BLOOD PRESSURE: 156 MMHG | DIASTOLIC BLOOD PRESSURE: 87 MMHG | TEMPERATURE: 100.5 F

## 2019-03-27 DIAGNOSIS — N39.0 ACUTE UTI (URINARY TRACT INFECTION): ICD-10-CM

## 2019-03-27 DIAGNOSIS — R33.9 URINARY RETENTION: ICD-10-CM

## 2019-03-27 DIAGNOSIS — E11.65 TYPE 2 DIABETES MELLITUS WITH HYPERGLYCEMIA, WITHOUT LONG-TERM CURRENT USE OF INSULIN (H): ICD-10-CM

## 2019-03-27 DIAGNOSIS — R10.84 ABDOMINAL PAIN, GENERALIZED: ICD-10-CM

## 2019-03-27 LAB
ALBUMIN UR-MCNC: 100 MG/DL
ANION GAP SERPL CALCULATED.3IONS-SCNC: 5 MMOL/L (ref 3–14)
APPEARANCE UR: ABNORMAL
BACTERIA #/AREA URNS HPF: ABNORMAL /HPF
BASOPHILS # BLD AUTO: 0 10E9/L (ref 0–0.2)
BASOPHILS NFR BLD AUTO: 0.4 %
BILIRUB UR QL STRIP: NEGATIVE
BUN SERPL-MCNC: 31 MG/DL (ref 7–30)
CALCIUM SERPL-MCNC: 9.2 MG/DL (ref 8.5–10.1)
CHLORIDE SERPL-SCNC: 103 MMOL/L (ref 94–109)
CO2 SERPL-SCNC: 27 MMOL/L (ref 20–32)
COLOR UR AUTO: YELLOW
CREAT SERPL-MCNC: 1.44 MG/DL (ref 0.52–1.04)
DIFFERENTIAL METHOD BLD: NORMAL
EOSINOPHIL # BLD AUTO: 0.2 10E9/L (ref 0–0.7)
EOSINOPHIL NFR BLD AUTO: 2.3 %
ERYTHROCYTE [DISTWIDTH] IN BLOOD BY AUTOMATED COUNT: 12.6 % (ref 10–15)
GFR SERPL CREATININE-BSD FRML MDRD: 33 ML/MIN/{1.73_M2}
GLUCOSE BLDC GLUCOMTR-MCNC: 257 MG/DL (ref 70–99)
GLUCOSE BLDC GLUCOMTR-MCNC: 266 MG/DL (ref 70–99)
GLUCOSE BLDC GLUCOMTR-MCNC: 347 MG/DL (ref 70–99)
GLUCOSE SERPL-MCNC: 392 MG/DL (ref 70–99)
GLUCOSE UR STRIP-MCNC: >1000 MG/DL
HCT VFR BLD AUTO: 40.8 % (ref 35–47)
HGB BLD-MCNC: 13.4 G/DL (ref 11.7–15.7)
HGB UR QL STRIP: ABNORMAL
IMM GRANULOCYTES # BLD: 0.1 10E9/L (ref 0–0.4)
IMM GRANULOCYTES NFR BLD: 0.5 %
KETONES UR STRIP-MCNC: NEGATIVE MG/DL
LACTATE BLD-SCNC: 1.8 MMOL/L (ref 0.7–2)
LEUKOCYTE ESTERASE UR QL STRIP: ABNORMAL
LYMPHOCYTES # BLD AUTO: 1.4 10E9/L (ref 0.8–5.3)
LYMPHOCYTES NFR BLD AUTO: 14.1 %
MCH RBC QN AUTO: 30.7 PG (ref 26.5–33)
MCHC RBC AUTO-ENTMCNC: 32.8 G/DL (ref 31.5–36.5)
MCV RBC AUTO: 94 FL (ref 78–100)
MONOCYTES # BLD AUTO: 0.9 10E9/L (ref 0–1.3)
MONOCYTES NFR BLD AUTO: 8.9 %
MUCOUS THREADS #/AREA URNS LPF: PRESENT /LPF
NEUTROPHILS # BLD AUTO: 7.5 10E9/L (ref 1.6–8.3)
NEUTROPHILS NFR BLD AUTO: 73.8 %
NITRATE UR QL: POSITIVE
NRBC # BLD AUTO: 0 10*3/UL
NRBC BLD AUTO-RTO: 0 /100
PH UR STRIP: 5.5 PH (ref 5–7)
PLATELET # BLD AUTO: 181 10E9/L (ref 150–450)
POTASSIUM SERPL-SCNC: 4.3 MMOL/L (ref 3.4–5.3)
RBC # BLD AUTO: 4.36 10E12/L (ref 3.8–5.2)
RBC #/AREA URNS AUTO: 26 /HPF (ref 0–2)
SODIUM SERPL-SCNC: 135 MMOL/L (ref 133–144)
SOURCE: ABNORMAL
SP GR UR STRIP: 1.02 (ref 1–1.03)
UROBILINOGEN UR STRIP-MCNC: NORMAL MG/DL (ref 0–2)
WBC # BLD AUTO: 10.1 10E9/L (ref 4–11)
WBC #/AREA URNS AUTO: >182 /HPF (ref 0–5)
WBC CLUMPS #/AREA URNS HPF: PRESENT /HPF

## 2019-03-27 PROCEDURE — 96366 THER/PROPH/DIAG IV INF ADDON: CPT

## 2019-03-27 PROCEDURE — 74176 CT ABD & PELVIS W/O CONTRAST: CPT

## 2019-03-27 PROCEDURE — 87086 URINE CULTURE/COLONY COUNT: CPT | Performed by: EMERGENCY MEDICINE

## 2019-03-27 PROCEDURE — 80048 BASIC METABOLIC PNL TOTAL CA: CPT | Performed by: EMERGENCY MEDICINE

## 2019-03-27 PROCEDURE — 25000132 ZZH RX MED GY IP 250 OP 250 PS 637: Performed by: EMERGENCY MEDICINE

## 2019-03-27 PROCEDURE — 25000128 H RX IP 250 OP 636: Performed by: EMERGENCY MEDICINE

## 2019-03-27 PROCEDURE — 51702 INSERT TEMP BLADDER CATH: CPT

## 2019-03-27 PROCEDURE — 96361 HYDRATE IV INFUSION ADD-ON: CPT | Mod: 59

## 2019-03-27 PROCEDURE — 96365 THER/PROPH/DIAG IV INF INIT: CPT

## 2019-03-27 PROCEDURE — 85025 COMPLETE CBC W/AUTO DIFF WBC: CPT | Performed by: EMERGENCY MEDICINE

## 2019-03-27 PROCEDURE — 87040 BLOOD CULTURE FOR BACTERIA: CPT | Mod: 91 | Performed by: EMERGENCY MEDICINE

## 2019-03-27 PROCEDURE — 81001 URINALYSIS AUTO W/SCOPE: CPT | Performed by: EMERGENCY MEDICINE

## 2019-03-27 PROCEDURE — 00000146 ZZHCL STATISTIC GLUCOSE BY METER IP

## 2019-03-27 PROCEDURE — 36415 COLL VENOUS BLD VENIPUNCTURE: CPT | Performed by: EMERGENCY MEDICINE

## 2019-03-27 PROCEDURE — 87186 SC STD MICRODIL/AGAR DIL: CPT | Performed by: EMERGENCY MEDICINE

## 2019-03-27 PROCEDURE — 87088 URINE BACTERIA CULTURE: CPT | Performed by: EMERGENCY MEDICINE

## 2019-03-27 PROCEDURE — 99284 EMERGENCY DEPT VISIT MOD MDM: CPT | Mod: 25

## 2019-03-27 PROCEDURE — 83605 ASSAY OF LACTIC ACID: CPT | Performed by: EMERGENCY MEDICINE

## 2019-03-27 RX ORDER — ACETAMINOPHEN 500 MG
1000 TABLET ORAL ONCE
Status: DISCONTINUED | OUTPATIENT
Start: 2019-03-27 | End: 2019-03-28 | Stop reason: HOSPADM

## 2019-03-27 RX ORDER — CEFTRIAXONE 1 G/1
1 INJECTION, POWDER, FOR SOLUTION INTRAMUSCULAR; INTRAVENOUS ONCE
Status: COMPLETED | OUTPATIENT
Start: 2019-03-27 | End: 2019-03-27

## 2019-03-27 RX ORDER — CEPHALEXIN 250 MG/5ML
500 POWDER, FOR SUSPENSION ORAL 2 TIMES DAILY
Qty: 140 ML | Refills: 0 | Status: SHIPPED | OUTPATIENT
Start: 2019-03-27 | End: 2019-04-03

## 2019-03-27 RX ADMIN — METFORMIN HYDROCHLORIDE 850 MG: 850 TABLET, FILM COATED ORAL at 20:56

## 2019-03-27 RX ADMIN — SODIUM CHLORIDE 1000 ML: 9 INJECTION, SOLUTION INTRAVENOUS at 18:57

## 2019-03-27 RX ADMIN — SODIUM CHLORIDE 1000 ML: 9 INJECTION, SOLUTION INTRAVENOUS at 19:53

## 2019-03-27 RX ADMIN — QUETIAPINE FUMARATE 12.5 MG: 25 TABLET, FILM COATED ORAL at 20:17

## 2019-03-27 RX ADMIN — CEFTRIAXONE SODIUM 1 G: 1 INJECTION, POWDER, FOR SOLUTION INTRAMUSCULAR; INTRAVENOUS at 19:30

## 2019-03-27 RX ADMIN — ACETAMINOPHEN 650 MG: 325 SOLUTION ORAL at 20:13

## 2019-03-27 ASSESSMENT — ENCOUNTER SYMPTOMS
FEVER: 0
DIFFICULTY URINATING: 1
VOMITING: 0

## 2019-03-27 NOTE — ED AVS SNAPSHOT
[facility] Emergency Department  [address]  [facility] [address]  Phone:  [phone]  Fax:  [phone]                                    Lula Aguilera   MRN: [id number]    Department:  [facility] Emergency Department   Date of Visit:  3/27/2019           After Visit Summary Signature Page    I have received my discharge instructions, and my questions have been answered. I have discussed any challenges I see with this plan with the nurse or doctor.    ..........................................................................................................................................  Patient/Patient Representative Signature      ..........................................................................................................................................  Patient Representative Print Name and Relationship to Patient    ..................................................               ................................................  Date                                   Time    ..........................................................................................................................................  Reviewed by Signature/Title    ...................................................              ..............................................  Date                                               Time          22EPIC Rev 08/18

## 2019-03-27 NOTE — ED PROVIDER NOTES
History     Chief Complaint:  Urinary Retention    History limited due to dementia. History supplemented by Son.     JEANETTE Aguilera is a 86 year old female with history of dementia, hypertension, type 2 diabetes, and urinary prolapse who presents to the emergency department today for evaluation of urinary retention. The patient has history of urinary prolapse, and has increased pain and trouble with urinating. The son notes he has been in discussion with the hospice since yesterday on what to do sine the pain has only been worsening. Here, the son notes she hasn't been able to pee all day which prompted his presentation here in the ED. She denies vomiting, fever, new symptoms, or having any recent abdominal surgeries.     Allergies:  Penicillins     Medications:    Xalatan  Actos  Metformin    Past Medical History:    Diabetes type 2  Glaucoma  Hyperlipidemia  Hypertension  Osteoporosis  Chronic kidney disease  Diverticulitis  Cystocele  Mild dementia    Past Surgical History:    Appendectomy    Family History:    Alzheimer disease    Social History:  The patient was accompanied to the ED by son.  Smoking Status: Never Smoker  Smokeless Tobacco: Never Used  Alcohol Use: Negative   Drug Use: Negative  PCP: Brandi Agrawal   Marital Status:  Single      Review of Systems   Constitutional: Negative for fever.   Gastrointestinal: Negative for vomiting.   Genitourinary: Positive for difficulty urinating.     History limited due to dementia.       Physical Exam     Patient Vitals for the past 24 hrs:   BP Temp Temp src Pulse Resp SpO2   03/27/19 2100 136/59 -- -- -- 16 96 %   03/27/19 1900 164/84 -- -- 87 18 --   03/27/19 1830 -- -- -- 79 -- 95 %   03/27/19 1815 143/87 100.5  F (38.1  C) Rectal 94 16 95 %   03/27/19 1800 162/83 -- -- 90 16 95 %   03/27/19 1745 171/90 -- -- 91 16 95 %   03/27/19 1730 141/68 -- -- 82 16 94 %   03/27/19 1715 167/89 -- -- 83 16 94 %   03/27/19 1651 126/80 98.3  F (36.8  C)  Temporal 92 16 95 %        Physical Exam  General: Elderly female, lying on stretcher, eyes closed  Eyes: Opens eyes to voice. PERRL, Conjunctive within normal limits.  ENT: Refuses to open mouth. When yells, tongue appears dry but otherwise normal in appearance.  Neck: no rigidity.  CV: Normal S1S2, no murmur, rub or gallop. Regular rate and rhythm  Resp: Clear to auscultation bilaterally. Diminished at the bases. Shallow inspiratory effort.  GI: Abdomen is soft. Yells with palpation, initially diffusely then seeming to localize to the RLQ and suprabpubic region. No palpable masses.   MSK: No edema. Moves extremities spontaneously, not on command.  Skin: Warm and dry. No rashes or lesions or ecchymoses on visible skin.  Neuro: Alert. Does not answer questions or follow commands. No focal findings appreciated. Normal muscle tone.  Psych: Flat affect.      Emergency Department Course     Imaging:  Radiology findings were communicated with the patient's son who voiced understanding of the findings.    Abd/pelvis CT no contrast - Stone Protocol  1. 0.8 cm left lower pole kidney stone. This is nonobstructing. There  is no hydronephrosis. No ureteral stone.  2. Scattered colonic diverticula, but no evidence of diverticulitis.  Reading per radiology     Laboratory:  Laboratory findings were communicated with the patient who voiced understanding of the findings.    Glucose by meter: 257 (H)  Glucose by meter: 347 (H)  Lactic Acid (Resulted: 1846): 1.8  Blood culture ONE site: In process   CBC: WBC 10.1, HGB 13.4,   BMP: Glcuose 392 (H), BUN 31 (H), Creatinine 1.44 (H), GFR Estimate 33 (L) o/w WNL.  UA with microscopic: GLC >1000 (A), Blood moderate (A), Protein albumin 100 (A), Nitrite Positive(A), Leukocyte esterase Large (A), WBC/HPF >182 (H), RBC/HPF 26 (H), WBC clumps present (A), Bacteria moderate (A), Mucous Present (A), o/w WNL.   Urine culture: In process      Interventions:  1857 NS 1000 ml IV  1930  Rocephin 1g IV  1953 NS 1000 ml IV  2013 Tylenol 650 mg PO  2017 Seroquel 12.5 mg PO  2056 Metformin 850 mg PO    Emergency Department Course:    1704 Nursing notes and vitals reviewed.    1729 The patient provided a urine sample here in the emergency department. This was sent for laboratory testing, findings above. IV was inserted and blood was drawn for laboratory testing, results above.      1735 I performed an exam of the patient as documented above.     1843 Patient rechecked and updated.      1911 The patient was sent for a CT abdomen/pelvis while in the emergency department, results above.      2206 Patient rechecked and updated.  No changes reported per son.     2347 I personally reviewed the laboratory and imaging results with the patient's son and answered all related questions prior to discharge.    Impression & Plan      Medical Decision Making:  Lula Aguilera is a 86 year old female with dementia who does not answer or follow commands typically who presents to the emergency department today for evaluation of facility concerns due to no urination over a 24 hour period. There is no new changes to her mental status. Here she is minimally verbal and cried out of pain during most interventions. Her belly was soft but seemed tender diffusely, even after fully catheter placed and drained to take out urine. She has obvious UTI upon urine analysis. She had low grade fever. She has so leukocytosis and only mild renal insufficient which is not entirely new for her. Blood sugar was elevated, suspect might've been her denial of nightly medicines. With IV fluids and metformin, her blood sugar improved. She was given IV fluids as well to help maintain urine output. Fully catheter was kept in place. She will need further assessment to determine the chronicity of a fully catheter given her chronic issues of obstruction due to history uterine prolapse. She was treated with ceftriaxone here, was sent home on keflex with  urine cultures pending. My suspicision for sepsis is extremely low. She is appropriate at this time for discharge at home. Discussed this plan with family, all questions were answered prior to discharge.     Diagnosis:    ICD-10-CM    1. Acute UTI (urinary tract infection) N39.0 Glucose by meter     Glucose by meter   2. Urinary retention R33.9    3. Type 2 diabetes mellitus with hyperglycemia, without long-term current use of insulin (H) E11.65    4. Abdominal pain, generalized R10.84      Disposition:   The patient is discharged to home.     Discharge Medications:     Review of your medicines      UNREVIEWED medicines. Ask your doctor about these medicines      Dose / Directions   ACE/ARB/ARNI NOT PRESCRIBED  Commonly known as:  INTENTIONAL      ACE & ARB not prescribed due to Intolerance  Refills:  0     ACETAMINOPHEN PO      Dose:  650 mg  Take 650 mg by mouth every 6 hours as needed for pain  Refills:  0     aspirin 81 MG EC tablet  Commonly known as:  ASA  Used for:  Type 2 diabetes, HbA1c goal < 7% (H)      Dose:  81 mg  Take 1 tablet by mouth daily.  Quantity:  90 tablet  Refills:  3     latanoprost 0.005 % ophthalmic solution  Commonly known as:  XALATAN      Dose:  1 drop  Place 1 drop into both eyes every evening  Refills:  0     LORazepam 0.5 MG tablet  Commonly known as:  ATIVAN  Used for:  Anxiety      Dose:  0.5 mg  Take 1 tablet (0.5 mg) by mouth every 6 hours as needed for anxiety  Quantity:  20 tablet  Refills:  0     metFORMIN 850 MG tablet  Commonly known as:  GLUCOPHAGE      Dose:  850 mg  Take 1 tablet (850 mg) by mouth 2 times daily (with meals)  Quantity:  60 tablet  Refills:  1     pioglitazone 30 MG tablet  Commonly known as:  ACTOS  Used for:  Type 2 diabetes mellitus with stage 3 chronic kidney disease, without long-term current use of insulin (H)      Dose:  30 mg  Take 1 tablet (30 mg) by mouth daily  Quantity:  90 tablet  Refills:  3     * SEROQUEL 25 MG tablet  Generic drug:   QUEtiapine      Dose:  12.5 mg  Take 12.5 mg by mouth daily  Refills:  0     * SEROQUEL 25 MG tablet  Generic drug:  QUEtiapine      Dose:  12.5 mg  Take 12.5 mg by mouth daily as needed  Refills:  0     STATIN NOT PRESCRIBED  Commonly known as:  INTENTIONAL      Statin not prescribed intentionally due to Intolerance (with supporting documentation of trying a statin at least once within the last 5 years)  Quantity:  0 each  Refills:  0     vitamin D3 2000 units tablet  Commonly known as:  CHOLECALCIFEROL      Dose:  2000 Units  Take 2,000 Units by mouth daily  Quantity:  100 tablet  Refills:  3         * This list has 2 medication(s) that are the same as other medications prescribed for you. Read the directions carefully, and ask your doctor or other care provider to review them with you.            START taking      Dose / Directions   cephALEXin 250 MG/5ML suspension  Commonly known as:  KEFLEX      Dose:  500 mg  Take 10 mLs (500 mg) by mouth 2 times daily for 7 days  Quantity:  140 mL  Refills:  0        CONTINUE these medicines which have NOT CHANGED      Dose / Directions   ADVOCATE SAFETY LANCETS Misc  Used for:  Type 2 diabetes mellitus with stage 3 chronic kidney disease (H)      Use to check blood sugars twice daily and as needed  Quantity:  2 Box  Refills:  3     blood glucose test strip  Commonly known as:  NO BRAND SPECIFIED  Used for:  Type 2 diabetes, HbA1c goal < 7% (H)      Use to test blood sugars 2 times daily or as directed  Quantity:  200 each  Refills:  3           Where to get your medicines      Some of these will need a paper prescription and others can be bought over the counter. Ask your nurse if you have questions.    Bring a paper prescription for each of these medications    cephALEXin 250 MG/5ML suspension         Scribe Disclosure:  I, Dieudonne Carbajal, am serving as a scribe at 5:41 PM on 3/27/2019 to document services personally performed by Crystal Mason MD based on my  observations and the provider's statements to me.     [facility] EMERGENCY DEPARTMENT       Crystal Mason MD  03/29/19 3325

## 2019-03-27 NOTE — ED TRIAGE NOTES
Patient comes in for evaluation of urinary retention due to uterine prolapse. Per family, patient has a history of uterine prolapse but it seems to be becoming more painful and causing urinary retention more frequently. ABCs intact. Patient also has dementia.

## 2019-03-28 NOTE — DISCHARGE INSTRUCTIONS
Discharge Instructions  Urinary Tract Infection  You or your child have been diagnosed with a urinary tract infection, or UTI. The urinary tract includes the kidneys (which make urine/pee), ureters (the tubes that carry urine/pee from the kidneys to the bladder), the bladder (which stores urine/pee), and urethra (the tube that carries urine/pee out of the bladder). Urinary tract infections occur when bacteria travel up the urethra into the bladder (bladder infection) and, in some cases, from there into the kidneys (kidney infection).  Generally, every Emergency Department visit should have a follow-up clinic visit with either a primary or a specialty clinic/provider. Please follow-up as instructed by your emergency provider today.  Return to the Emergency Department if:  You or your child have severe back pain.  You or your child are vomiting (throwing up) so that you cannot take your medicine.  You or your child have a new fever (had not previously had a fever) over 101 F.  You or your child have confusion or are very weak, or feel very ill.  Your child seems much more ill, will not wake up, will not respond right, or is crying for a long time and will not calm down.  You or your child are showing signs of dehydration. These signs may include decreased urination (pee), dry mouth/gums/tongue, or decreased activity.    Follow-up with your provider:   Children under 24 months need to be seen by their regular provider within one week after a diagnosis of a UTI. It may be necessary to do some more tests to look at the child?s kidney or bladder.  You should begin to feel better within 24 - 48 hours of starting your antibiotic; follow-up with your regular clinic/doctor/provider if this is not the case.    Treatment:   You will be treated with an antibiotic to kill the bacteria. We have to make an educated guess, based on what we know about common bacteria and antibiotics, as to which antibiotic will work for your  "infection. We will be correct most times but there will be some cases where the antibiotic chosen is not correct (see urine cultures below).  Take a pain medication such as acetaminophen (Tylenol ) or ibuprofen (Advil , Motrin , Nuprin ).  Phenazopyridine (Pyridium , Uristat ) is a prescription medication that numbs the bladder to reduce the burning pain of some UTIs.  The same medication is available in a non-prescription version (Azo-Standard , Urodol ). This medication will change the color of the urine and tears (usually blue or orange). If you wear contacts, do not wear them while taking this medication as they may be stained by the medication.    Urine Cultures:  If indicated, a urine culture may have been performed today. This test generally takes 24-48 hours to complete so the results are not known at this time. The results can confirm that an infection is present but also determine which antibiotic is effective for the specific bacteria that is causing the infection. If your urine culture shows that the antibiotic you were given today will not work to treat your infection, we will attempt to contact you to make arrangements to change the antibiotic. If the culture confirms that the antibiotic is effective for your infection, you will not be contacted. We often recommend follow-up with your regular physician/provider on the culture results regardless of this process.    Antibiotic Warning:   If you have been placed on antibiotics - watch for signs of allergic reaction.  These include rash, lip swelling, difficulty breathing, wheezing, and dizziness.  If you develop any of these symptoms, stop the antibiotic immediately and go to an emergency room or urgent care for evaluation.    Probiotics: If you have been given an antibiotic, you may want to also take a probiotic pill or eat yogurt with live cultures. Probiotics have \"good bacteria\" to help your intestines stay healthy. Studies have shown that probiotics " help prevent diarrhea and other intestine problems (including C. diff infection) when you take antibiotics. You can buy these without a prescription in the pharmacy section of the store.   If you were given a prescription for medicine here today, be sure to read all of the information (including the package insert) that comes with your prescription.  This will include important information about the medicine, its side effects, and any warnings that you need to know about.  The pharmacist who fills the prescription can provide more information and answer questions you may have about the medicine.  If you have questions or concerns that the pharmacist cannot address, please call or return to the Emergency Department.   Remember that you can always come back to the Emergency Department if you are not able to see your regular provider in the amount of time listed above, if you get any new symptoms, or if there is anything that worries you.     Discharge Instructions  Hyperglycemia, High Blood Sugar    Today we found your blood sugar (glucose) was high. This may mean that you have developed diabetes, or if you already know that you have diabetes, it may mean that your diabetes is not as well controlled as it should be. Sometimes blood sugar can be high temporarily and it is not diabetes. Signs of elevated blood sugar include increased thirst, frequent urination (peeing), blurred vision, fatigue, unexplained weight loss, poor wound healing, and frequent infections.    We sometimes give medicine in the Emergency Department to lower the blood sugar. We may also prescribe medicine for you to use at home, or increase the medicine that you already take. While we do not like to see your blood sugar high, it is much more dangerous to let your blood sugar get too low, so it is reasonable to take time to bring it down, or to wait and watch to see if it comes down on its own.    Generally, every Emergency Department visit should  have a follow-up clinic visit with either a primary or a specialty clinic/provider. Please follow-up as instructed by your emergency provider today.     Return to the Emergency Department if you develop:  Vomiting (throwing up).  Confusion, disorientation, or being unable to wake up.  Severe weakness or illness.  Abdominal (belly) pain.    What can I do to help myself?  Check your blood sugar as instructed by your provider.  Take medications prescribed by your provider.  Follow a diabetic diet (low fat, low concentrated sweets, high fiber).  Exercise regularly.  Moderate or eliminate alcohol use.  Stop smoking.    Diabetes: Diabetes mellitus is a disease in which the body cannot regulate the amount of sugar (glucose) in the blood. Insulin allows glucose to move out of the blood into cells throughout the body where it is used for fuel. People with diabetes do not produce enough insulin (type 1 diabetes), or cannot use insulin properly (type 2 diabetes), or both. This starves the cells that need the glucose for fuel, and also harms certain organs and tissues exposed to the high glucose levels.  Over a long period of time, uncontrolled diabetes can lead to heart and blood vessel disease, blindness, kidney failure, foot ulcers and many other problems.          About 17 million Americans (6.2% of adults) have diabetes. We think that about one third of adults with diabetes do not know they have diabetes.  The incidence of diabetes is increasing rapidly. This increase is due to many factors, but the most significant are our increasing weight and decreased activity levels.     Diabetes can be a very serious and life-threatening illness if not treated.  If you were given a prescription for medicine here today, be sure to read all of the information (including the package insert) that comes with your prescription.  This will include important information about the medicine, its side effects, and any warnings that you need to  know about.  The pharmacist who fills the prescription can provide more information and answer questions you may have about the medicine.  If you have questions or concerns that the pharmacist cannot address, please call or return to the Emergency Department.   Remember that you can always come back to the Emergency Department if you are not able to see your regular provider in the amount of time listed above, if you get any new symptoms, or if there is anything that worries you.   Discharge Instructions  Abdominal Pain    Abdominal pain (belly pain) can be caused by many things. Your evaluation today does not show the exact cause for your pain. Your provider today has decided that it is unlikely your pain is due to a life threatening problem, or a problem requiring surgery or hospital admission. Sometimes those problems cannot be found right away, so it is very important that you follow up as directed.  Sometimes only the changes which occur over time allow the cause of your pain to be found.    Generally, every Emergency Department visit should have a follow-up clinic visit with either a primary or a specialty clinic/provider. Please follow-up as instructed by your emergency provider today. With abdominal pain, we often recommend very close follow-up, such as the following day.    ADULTS:  Return to the Emergency Department right away if:    You get an oral temperature above 102oF or as directed by your provider.  You have blood in your stools. This may be bright red or appear as black, tarry stools.    You keep vomiting (throwing up) or cannot drink liquids.  You see blood when you vomit.   You cannot have a bowel movement or you cannot pass gas.  Your stomach gets bloated or bigger.  Your skin or the whites of your eyes look yellow.  You faint.  You have bloody, frequent or painful urination (peeing).  You have new symptoms or anything that worries you.    CHILDREN:  Return to the Emergency Department right away  if your child has any of the above-listed symptoms or the following:    Pushes your hand away or screams/cries when his/her belly is touched.  You notice your child is very fussy or weak.  Your child is very tired and is too tired to eat or drink.  Your child is dehydrated.  Signs of dehydration can be:  Significant change in the amount of wet diapers/urine.  Your infant or child starts to have dry mouth and lips, or no saliva (spit) or tears.    PREGNANT WOMEN:  Return to the Emergency Department right away if you have any of the above-listed symptoms or the following:    You have bleeding, leaking fluid or passing tissue from the vagina.  You have worse pain or cramping, or pain in your shoulder or back.  You have vomiting that will not stop.  You have a temperature of 100oF or more.  Your baby is not moving as much as usual.  You faint.  You get a bad headache with or without eye problems and abdominal pain.  You have a seizure.  You have unusual discharge from your vagina and abdominal pain.    Abdominal pain is pretty common during pregnancy.  Your pain may or may not be related to your pregnancy. You should follow-up closely with your OB provider so they can evaluate you and your baby.  Until you follow-up with your regular provider, do the following:     Avoid sex and do not put anything in your vagina.  Drink clear fluids.  Only take medications approved by your provider.    MORE INFORMATION:    Appendicitis:  A possible cause of abdominal pain in any person who still has their appendix is acute appendicitis. Appendicitis is often hard to diagnose.  Testing does not always rule out early appendicitis or other causes of abdominal pain. Close follow-up with your provider and re-evaluations may be needed to figure out the reason for your abdominal pain.    Follow-up:  It is very important that you make an appointment with your clinic and go to the appointment.  If you do not follow-up with your primary  "provider, it may result in missing an important development which could result in permanent injury or disability and/or lasting pain.  If there is any problem keeping your appointment, call your provider or return to the Emergency Department.    Medications:  Take your medications as directed by your provider today.  Before using over-the-counter medications, ask your provider and make sure to take the medications as directed.  If you have any questions about medications, ask your provider.    Diet:  Resume your normal diet as much as possible, but do not eat fried, fatty or spicy foods while you have pain.  Do not drink alcohol or have caffeine.  Do not smoke tobacco.    Probiotics: If you have been given an antibiotic, you may want to also take a probiotic pill or eat yogurt with live cultures. Probiotics have \"good bacteria\" to help your intestines stay healthy. Studies have shown that probiotics help prevent diarrhea (loose stools) and other intestine problems (including C. diff infection) when you take antibiotics. You can buy these without a prescription in the pharmacy section of the store.     If you were given a prescription for medicine here today, be sure to read all of the information (including the package insert) that comes with your prescription.  This will include important information about the medicine, its side effects, and any warnings that you need to know about.  The pharmacist who fills the prescription can provide more information and answer questions you may have about the medicine.  If you have questions or concerns that the pharmacist cannot address, please call or return to the Emergency Department.       Remember that you can always come back to the Emergency Department if you are not able to see your regular provider in the amount of time listed above, if you get any new symptoms, or if there is anything that worries you.   "

## 2019-03-28 NOTE — RESULT ENCOUNTER NOTE
Emergency Dept/Urgent Care discharge antibiotic (if prescribed): Cephalexin (Keflex) 250 MG/5ML susp, 10 mLs (500 mg) by mouth 2 times daily for 7 days   Date of Rx (if applicable):  3/27/19  No changes in treatment per Urine culture protocol.

## 2019-03-28 NOTE — ED NOTES
Report called to [facility], to caregiver Aleks.  Facility is ready to receive pt.  Transport being set up.

## 2019-03-29 LAB
BACTERIA SPEC CULT: ABNORMAL
Lab: ABNORMAL
SPECIMEN SOURCE: ABNORMAL

## 2019-03-29 NOTE — RESULT ENCOUNTER NOTE
Final Urine Culture Report on 3/29/19  Emergency Dept/Urgent Care discharge antibiotic prescribed: Cephalexin (Keflex) 250 MG/5ML susp, 10 mLs (500 mg) by mouth 2 times daily for 7 days   #1. Bacteria, >100,000 colonies/mL Escherichia coli, is SUSCEPTIBLE to Antibiotic.    As per [facility] ED Lab Result protocol, no change in antibiotic therapy.

## 2019-04-02 LAB
BACTERIA SPEC CULT: NO GROWTH
Lab: NORMAL
SPECIMEN SOURCE: NORMAL

## 2019-04-03 LAB
BACTERIA SPEC CULT: NO GROWTH
Lab: NORMAL
SPECIMEN SOURCE: NORMAL

## 2019-04-04 ENCOUNTER — RECORDS - HEALTHEAST (OUTPATIENT)
Dept: LAB | Facility: CLINIC | Age: 84
End: 2019-04-04

## 2019-04-04 LAB
ALBUMIN UR-MCNC: ABNORMAL MG/DL
AMORPH CRY #/AREA URNS HPF: ABNORMAL /[HPF]
APPEARANCE UR: ABNORMAL
BACTERIA #/AREA URNS HPF: ABNORMAL HPF
BILIRUB UR QL STRIP: NEGATIVE
COLOR UR AUTO: YELLOW
GLUCOSE UR STRIP-MCNC: ABNORMAL MG/DL
GRAN CASTS #/AREA URNS LPF: ABNORMAL LPF
HGB UR QL STRIP: ABNORMAL
HYALINE CASTS #/AREA URNS LPF: ABNORMAL LPF
KETONES UR STRIP-MCNC: ABNORMAL MG/DL
LEUKOCYTE ESTERASE UR QL STRIP: ABNORMAL
MUCOUS THREADS #/AREA URNS LPF: ABNORMAL LPF
NITRATE UR QL: NEGATIVE
PH UR STRIP: 5 [PH] (ref 4.5–8)
RBC #/AREA URNS AUTO: ABNORMAL HPF
SP GR UR STRIP: 1.02 (ref 1–1.03)
SQUAMOUS #/AREA URNS AUTO: ABNORMAL LPF
TRI-PHOS CRY #/AREA URNS HPF: PRESENT /[HPF]
UROBILINOGEN UR STRIP-ACNC: ABNORMAL
WBC #/AREA URNS AUTO: ABNORMAL HPF

## 2019-04-05 LAB — BACTERIA SPEC CULT: NO GROWTH

## 2020-10-14 NOTE — ED NOTES
.  [facility]  ED Nurse Handoff Report    Lula Aguilera is a 85 year old female   ED Chief complaint: Fall  . ED Diagnosis: Fall, confusion, Elevated trop. UTI, Diverticulitis  Final diagnoses:   None     Allergies:   Allergies   Allergen Reactions     Penicillins        Code Status: Full Code  Activity level - Baseline/Home:  Stand with Assist. Activity Level - Current:   Stand with Assist of 2. Lift room needed: No. Bariatric: No   Needed: No   Isolation: No. Infection: Not Applicable.     Vital Signs:   Vitals:    09/13/17 1310 09/13/17 1402 09/13/17 1403 09/13/17 1404   BP:  (!) 128/103     Resp:       Temp:       TempSrc:       SpO2: 94%  94% 95%       Cardiac Rhythm:  ,      Pain level:    Patient confused: Yes. Patient Falls Risk: Yes.   Elimination Status: Has voided   Patient Report - Confusion. Focused Assessment[facility] Per EMS, staff were doing rounds when they found pt undressed in her laundry basket in closet. Pt ambulatory on scene with EMS but staff states she is a little slower in her gait than normal. No change in neuro status, at pt's baseline with dementia. Pt denies pain.   Tests Performed: Imaging Abnormal Results:   Abnormal Result -   Troponin I ES: 0.106 ug/L [Ref Range: 0.000 - 0.045] (The 99th percentile for upper reference range is 0.045 ug/L. Troponin values   in the range of 0.045 - 0.120 ug/L may be associated with risks of adverse   clinical events.   )  Collected: 9/13/2017 09:55  Last updated: 9/13/2017 10:26 FI    10:26 Comprehensive metabolic panel Resulted Abnormal Result -   Sodium: 138 mmol/L [Ref Range: 133 - 144]  Potassium: 3.9 mmol/L [Ref Range: 3.4 - 5.3]  Chloride: 102 mmol/L [Ref Range: 94 - 109]  Carbon Dioxide: 27 mmol/L [Ref Range: 20 - 32]  Anion Gap: 9 mmol/L [Ref Range: 3 - 14]  Glucose: 148 mg/dL [Ref Range: 70 - 99]  Urea Nitrogen: 26 mg/dL [Ref Range: 7 - 30]  Creatinine: 1.10 mg/dL [Ref Range: 0.52  Discharge Instructions    Discharged to home by car with relative. Discharged via wheelchair, hospital escort: Yes.  Special equipment needed: Not Applicable    Be sure to schedule a follow-up appointment with your primary care doctor or any specialists as instructed.     Discharge Plan:   Diet Plan: Discussed  Confirmed Follow up Appointment: Patient to Call and Schedule Appointment  Confirmed Symptoms Management: Discussed  Medication Reconciliation Updated: Yes  Influenza Vaccine Indication: Not indicated: Previously immunized this influenza season and > 8 years of age    I understand that a diet low in cholesterol, fat, and sodium is recommended for good health. Unless I have been given specific instructions below for another diet, I accept this instruction as my diet prescription.   Other diet: regular    Special Instructions: None    · Is patient discharged on Warfarin / Coumadin?   No     Depression / Suicide Risk    As you are discharged from this [facility] facility, it is important to learn how to keep safe from harming yourself.    Recognize the warning signs:  · Abrupt changes in personality, positive or negative- including increase in energy   · Giving away possessions  · Change in eating patterns- significant weight changes-  positive or negative  · Change in sleeping patterns- unable to sleep or sleeping all the time   · Unwillingness or inability to communicate  · Depression  · Unusual sadness, discouragement and loneliness  · Talk of wanting to die  · Neglect of personal appearance   · Rebelliousness- reckless behavior  · Withdrawal from people/activities they love  · Confusion- inability to concentrate     If you or a loved one observes any of these behaviors or has concerns about self-harm, here's what you can do:  · Talk about it- your feelings and reasons for harming yourself  · Remove any means that you might use to hurt yourself (examples: pills, rope, extension cords, firearm)  · Get  - 1.04]  GFR Estimate: 47 mL/min/1.7m2 [Ref Range: >60] (Non  GFR Calc)  GFR Estimate If Black: 57 mL/min/1.7m2 [Ref Range: >60] ( GFR Calc)  Calcium: 8.9 mg/dL [Ref Range: 8.5 - 10.1]  Bilirubin Total: 0.5 mg/dL [Ref Range: 0.2 - 1.3]  Albumin: 3.4 g/dL [Ref Range: 3.4 - 5.0]  Protein Total: 7.0 g/dL [Ref Range: 6.8 - 8.8]  Alkaline Phosphatase: 63 U/L [Ref Range: 40 - 150]  ALT: 13 U/L [Ref Range: 0 - 50]  AST: 17 U/L [Ref Range: 0 - 45]  Collected: 9/13/2017 09:55       Treatments provided: IV antibiotics, frequent assessment.  Family Comments: Son aware  OBS brochure/video discussed/provided to patient:  N/A  ED Medications:   Medications   metroNIDAZOLE (FLAGYL) infusion 500 mg (500 mg Intravenous New Bag 9/13/17 1432)   ciprofloxacin (CIPRO) infusion 400 mg (not administered)   iopamidol (ISOVUE-370) solution 500 mL (85 mLs Intravenous Given 9/13/17 1106)   0.9% sodium chloride BOLUS (0 mLs Intravenous Stopped 9/13/17 1117)   aspirin tablet 325 mg (325 mg Oral Given 9/13/17 1156)   ondansetron (ZOFRAN) injection 4 mg (4 mg Intravenous Given 9/13/17 1322)     Drips infusing:  Yes  For the majority of the shift, the patient's behavior Green. Interventions performed were NA     Severe Sepsis OR Septic Shock Diagnosis Present: No      ED Nurse Name/Phone Number: Crystal LINDSAY Fall,   2:52 PM  RECEIVING UNIT ED HANDOFF REVIEW    Above ED Nurse Handoff Report was reviewed: Yes  Reviewed by: Maryana Salvador on September 13, 2017 at 3:41 PM      professional help from the community (Mental Health, Substance Abuse, psychological counseling)  · Do not be alone:Call your Safe Contact- someone whom you trust who will be there for you.  · Call your local CRISIS HOTLINE 858-6733 or 121-483-6785  · Call your local Children's Mobile Crisis Response Team [place] (871) 597-5666 or www.Defend Your [employer]  · Call the toll free National Suicide Prevention Hotlines   · National Suicide Prevention Lifeline 569-931-FUKR (0750)  · [employer] Hope Line Network 800-SUICIDE (564-0332)    Discharge Instructions    Discharged to home by car with friend. Discharged via wheelchair, hospital escort: Yes.  Special equipment needed: Not Applicable    Be sure to schedule a follow-up appointment with your primary care doctor or any specialists as instructed.     Discharge Plan:   Influenza Vaccine Indication: Not indicated: Previously immunized this influenza season and > 8 years of age    I understand that a diet low in cholesterol, fat, and sodium is recommended for good health. Unless I have been given specific instructions below for another diet, I accept this instruction as my diet prescription.   Other diet: Regular    Special Instructions: None    · Is patient discharged on Warfarin / Coumadin?   No     Depression / Suicide Risk    As [facility] are [facility], it is important to learn how to keep safe from harming yourself.    Recognize the warning signs:  · Abrupt changes in personality, positive or negative- including increase in energy   · Giving away possessions  · Change in eating patterns- significant weight changes-  positive or negative  · Change in sleeping patterns- unable to sleep or sleeping all the time   · Unwillingness or inability to communicate  · Depression  · Unusual sadness, discouragement and loneliness  · Talk of wanting to die  · Neglect of personal appearance   · Rebelliousness- reckless behavior  · Withdrawal from  people/activities they love  · Confusion- inability to concentrate     If you or a loved one observes any of these behaviors or has concerns about self-harm, here's what you can do:  · Talk about it- your feelings and reasons for harming yourself  · Remove any means that you might use to hurt yourself (examples: pills, rope, extension cords, firearm)  · Get professional help from the community (Mental Health, Substance Abuse, psychological counseling)  · Do not be alone:Call your Safe Contact- someone whom you trust who will be there for you.  · Call your local CRISIS HOTLINE 161-1009 or 717-277-4324  · Call your local Children's Mobile Crisis Response Team [place] (421) 013-6026 or www.[employer]  · Call the toll free National Suicide Prevention Hotlines   · National Suicide Prevention Lifeline 961-805-TGBT (7541)  · [employer] Line Network 800-SUICIDE (283-1535)        Cellulitis, Adult    Cellulitis is a skin infection. The infected area is often warm, red, swollen, and sore. It occurs most often in the arms and lower legs. It is very important to get treated for this condition.  What are the causes?  This condition is caused by bacteria. The bacteria enter through a break in the skin, such as a cut, burn, insect bite, open sore, or crack.  What increases the risk?  This condition is more likely to occur in people who:  · Have a weak body defense system (immune system).  · Have open cuts, burns, bites, or scrapes on the skin.  · Are older than 60 years of age.  · Have a blood sugar problem (diabetes).  · Have a long-lasting (chronic) liver disease (cirrhosis) or kidney disease.  · Are very overweight (obese).  · Have a skin problem, such as:  ? Itchy rash (eczema).  ? Slow movement of blood in the veins (venous stasis).  ? Fluid buildup below the skin (edema).  · Have been treated with high-energy rays (radiation).  · Use IV drugs.  What are the signs or symptoms?  Symptoms of this condition  include:  · Skin that is:  ? Red.  ? Streaking.  ? Spotting.  ? Swollen.  ? Sore or painful when you touch it.  ? Warm.  · A fever.  · Chills.  · Blisters.  How is this diagnosed?  This condition is diagnosed based on:  · Medical history.  · Physical exam.  · Blood tests.  · Imaging tests.  How is this treated?  Treatment for this condition may include:  · Medicines to treat infections or allergies.  · Home care, such as:  ? Rest.  ? Placing cold or warm cloths (compresses) on the skin.  · Hospital care, if the condition is very bad.  Follow these instructions at home:  Medicines  · Take over-the-counter and prescription medicines only as told by your doctor.  · If you were prescribed an antibiotic medicine, take it as told by your doctor. Do not stop taking it even if you start to feel better.  General instructions    · Drink enough fluid to keep your pee (urine) pale yellow.  · Do not touch or rub the infected area.  · Raise (elevate) the infected area above the level of your heart while you are sitting or lying down.  · Place cold or warm cloths on the area as told by your doctor.  · Keep all follow-up visits as told by your doctor. This is important.  Contact a doctor if:  · You have a fever.  · You do not start to get better after 1-2 days of treatment.  · Your bone or joint under the infected area starts to hurt after the skin has healed.  · Your infection comes back. This can happen in the same area or another area.  · You have a swollen bump in the area.  · You have new symptoms.  · You feel ill and have muscle aches and pains.  Get help right away if:  · Your symptoms get worse.  · You feel very sleepy.  · You throw up (vomit) or have watery poop (diarrhea) for a long time.  · You see red streaks coming from the area.  · Your red area gets larger.  · Your red area turns dark in color.  These symptoms may represent a serious problem that is an emergency. Do not wait to see if the symptoms will go away. Get  medical help right away. Call your local emergency services (911 in the [place] Do not drive yourself to the hospital.  Summary  · Cellulitis is a skin infection. The area is often warm, red, swollen, and sore.  · This condition is treated with medicines, rest, and cold and warm cloths.  · Take all medicines only as told by your doctor.  · Tell your doctor if symptoms do not start to get better after 1-2 days of treatment.  This information is not intended to replace advice given to you by your health care provider. Make sure you discuss any questions you have with your health care provider.  Document Released: 06/05/2009 Document Revised: 05/09/2019 Document Reviewed: 05/09/2019  [employer] Patient Education © 2020 [employer]      Otitis Externa    Otitis externa is an infection of the outer ear canal. The outer ear canal is the area between the outside of the ear and the eardrum. Otitis externa is sometimes called swimmer's ear.  What are the causes?  Common causes of this condition include:  · Swimming in dirty water.  · Moisture in the ear.  · An injury to the inside of the ear.  · An object stuck in the ear.  · A cut or scrape on the outside of the ear.  What increases the risk?  You are more likely to get this condition if you go swimming often.  What are the signs or symptoms?  · Itching in the ear. This is often the first symptom.  · Swelling of the ear.  · Redness in the ear.  · Ear pain. The pain may get worse when you pull on your ear.  · Pus coming from the ear.  How is this treated?  This condition may be treated with:  · Antibiotic ear drops. These are often given for 10-14 days.  · Medicines to reduce itching and swelling.  Follow these instructions at home:  · If you were given antibiotic ear drops, use them as told by your doctor. Do not stop using them even if your condition gets better.  · Take over-the-counter and prescription medicines only as told by your doctor.  · Avoid getting water in your  ears as told by your doctor. You may be told to avoid swimming or water sports for a few days.  · Keep all follow-up visits as told by your doctor. This is important.  How is this prevented?  · Keep your ears dry. Use the corner of a towel to dry your ears after you swim or bathe.  · Try not to scratch or put things in your ear. Doing these things makes it easier for germs to grow in your ear.  · Avoid swimming in lakes, dirty water, or pools that may not have the right amount of a chemical called chlorine.  Contact a doctor if:  · You have a fever.  · Your ear is still red, swollen, or painful after 3 days.  · You still have pus coming from your ear after 3 days.  · Your redness, swelling, or pain gets worse.  · You have a really bad headache.  · You have redness, swelling, pain, or tenderness behind your ear.  Summary  · Otitis externa is an infection of the outer ear canal.  · Symptoms include pain, redness, and swelling of the ear.  · If you were given antibiotic ear drops, use them as told by your doctor. Do not stop using them even if your condition gets better.  · Try not to scratch or put things in your ear.  This information is not intended to replace advice given to you by your health care provider. Make sure you discuss any questions you have with your health care provider.  Document Released: 06/05/2009 Document Revised: 05/24/2019 Document Reviewed: 05/24/2019  [employer] Patient Education © 2020 [employer]

## 2024-06-17 PROBLEM — Z76.89 HEALTH CARE HOME: Status: RESOLVED | Noted: 2017-08-07 | Resolved: 2024-06-17
